# Patient Record
Sex: FEMALE | Race: WHITE | NOT HISPANIC OR LATINO | Employment: FULL TIME | URBAN - METROPOLITAN AREA
[De-identification: names, ages, dates, MRNs, and addresses within clinical notes are randomized per-mention and may not be internally consistent; named-entity substitution may affect disease eponyms.]

---

## 2017-01-01 ENCOUNTER — E-VISIT (OUTPATIENT)
Dept: DERMATOLOGY | Facility: CLINIC | Age: 52
End: 2017-01-01

## 2017-01-01 DIAGNOSIS — R30.0 DYSURIA: Primary | ICD-10-CM

## 2017-01-02 NOTE — TELEPHONE ENCOUNTER
Called pt and she states was seen by [facility] provider in [place] yesterday and was evaluated there and prescribed Nitrofurantoin for UTI. Urine was cultured. Pt is drinking fluids and feels better since starting the antibiotic. Encouraged yogurt and probiotic OTC to help with as well as encouraged fluids. Pt will call  Clinic and re-establish a provider since PCP Dr. Rivera has left if not better. Leonel

## 2017-01-03 NOTE — TELEPHONE ENCOUNTER
Was sent to wrong provider, sent to dermatology. I am not sure why so then I sent to PCP in [place] and our [place].

## 2017-01-16 ENCOUNTER — TRANSFERRED RECORDS (OUTPATIENT)
Dept: HEALTH INFORMATION MANAGEMENT | Facility: CLINIC | Age: 52
End: 2017-01-16

## 2017-05-07 ENCOUNTER — HOSPITAL ENCOUNTER (EMERGENCY)
Facility: CLINIC | Age: 52
Discharge: HOME | End: 2017-05-07
Attending: FAMILY MEDICINE | Admitting: FAMILY MEDICINE
Payer: OTHER MISCELLANEOUS

## 2017-05-07 ENCOUNTER — APPOINTMENT (OUTPATIENT)
Dept: GENERAL RADIOLOGY | Facility: CLINIC | Age: 52
End: 2017-05-07
Attending: FAMILY MEDICINE
Payer: OTHER MISCELLANEOUS

## 2017-05-07 ENCOUNTER — TELEPHONE (OUTPATIENT)
Dept: NURSING | Facility: CLINIC | Age: 52
End: 2017-05-07

## 2017-05-07 VITALS — TEMPERATURE: 98.1 F | DIASTOLIC BLOOD PRESSURE: 96 MMHG | SYSTOLIC BLOOD PRESSURE: 148 MMHG | OXYGEN SATURATION: 100 %

## 2017-05-07 DIAGNOSIS — M75.01 ADHESIVE CAPSULITIS OF RIGHT SHOULDER: ICD-10-CM

## 2017-05-07 DIAGNOSIS — M75.21 BICIPITAL TENDINITIS OF RIGHT SHOULDER: ICD-10-CM

## 2017-05-07 DIAGNOSIS — M75.41 IMPINGEMENT SYNDROME OF RIGHT SHOULDER: ICD-10-CM

## 2017-05-07 PROCEDURE — 99284 EMERGENCY DEPT VISIT MOD MDM: CPT | Mod: 25 | Performed by: FAMILY MEDICINE

## 2017-05-07 PROCEDURE — 20610 DRAIN/INJ JOINT/BURSA W/O US: CPT

## 2017-05-07 PROCEDURE — 99284 EMERGENCY DEPT VISIT MOD MDM: CPT | Mod: 25

## 2017-05-07 PROCEDURE — 73030 X-RAY EXAM OF SHOULDER: CPT | Mod: RT

## 2017-05-07 PROCEDURE — 20610 DRAIN/INJ JOINT/BURSA W/O US: CPT | Performed by: FAMILY MEDICINE

## 2017-05-07 RX ORDER — LIDOCAINE HYDROCHLORIDE 10 MG/ML
10 INJECTION, SOLUTION INFILTRATION; PERINEURAL ONCE
Status: DISCONTINUED | OUTPATIENT
Start: 2017-05-07 | End: 2017-05-07 | Stop reason: HOSPADM

## 2017-05-07 RX ORDER — BUPIVACAINE HYDROCHLORIDE 2.5 MG/ML
10 INJECTION, SOLUTION INFILTRATION; PERINEURAL ONCE
Status: DISCONTINUED | OUTPATIENT
Start: 2017-05-07 | End: 2017-05-07 | Stop reason: HOSPADM

## 2017-05-07 RX ORDER — HYDROCODONE BITARTRATE AND ACETAMINOPHEN 5; 325 MG/1; MG/1
1-2 TABLET ORAL EVERY 6 HOURS PRN
Qty: 6 TABLET | Refills: 0 | Status: SHIPPED | OUTPATIENT
Start: 2017-05-07 | End: 2017-05-07

## 2017-05-07 RX ORDER — TRIAMCINOLONE ACETONIDE 40 MG/ML
40 INJECTION, SUSPENSION INTRA-ARTICULAR; INTRAMUSCULAR ONCE
Status: DISCONTINUED | OUTPATIENT
Start: 2017-05-07 | End: 2017-05-07 | Stop reason: HOSPADM

## 2017-05-07 RX ORDER — HYDROCODONE BITARTRATE AND ACETAMINOPHEN 5; 325 MG/1; MG/1
1-2 TABLET ORAL EVERY 6 HOURS PRN
Qty: 10 TABLET | Refills: 0 | Status: SHIPPED | OUTPATIENT
Start: 2017-05-07 | End: 2017-08-28

## 2017-05-07 ASSESSMENT — ENCOUNTER SYMPTOMS
DYSURIA: 0
HEADACHES: 0
SINUS PRESSURE: 0
FREQUENCY: 0
WHEEZING: 0
PALPITATIONS: 0
DIAPHORESIS: 0
NAUSEA: 0
ABDOMINAL PAIN: 0
BLOOD IN STOOL: 0
VOMITING: 0
FEVER: 0
CONSTIPATION: 0
SORE THROAT: 0
COUGH: 0
DIARRHEA: 0
SHORTNESS OF BREATH: 0

## 2017-05-07 NOTE — ED PROVIDER NOTES
History   No chief complaint on file.    HPI  Devika Laird is a 51 year old female who presents with RT shoulder pain onset years ago, with worsening in the last few months.  Works as  and chronic overuse of the RT arm. No injury, no fall.  No osteoporosis.  No significant neck conditions.   No fever  much worse on friday and then immobility over the last 24 hours limited by painful range of motion.   No overlying rash.    MS - followed by Dale Chowdhury, Neurology    Patient Active Problem List   Diagnosis     Anxiety state     Other nonspecific abnormal result of function study of brain and central nervous system     MS (multiple sclerosis) (H)     Uterine leiomyoma     CARDIOVASCULAR SCREENING; LDL GOAL LESS THAN 160     Health Care Home     Pulmonary nodule     History of tobacco use     Family history of breast cancer in mother     Screening for colorectal cancer     Multiple sclerosis (H)     Current Outpatient Prescriptions   Medication Sig Dispense Refill     cyclobenzaprine (FLEXERIL) 10 MG tablet Take 0.5-1 tablets (5-10 mg) by mouth 3 times daily as needed for muscle spasms 30 tablet 1     Zinc 100 MG TABS Take 1 tablet by mouth       COPAXONE 20 MG/ML injection Inject 40 mg Subcutaneous every other day   10     magnesium 100 MG CAPS Take 1 tablet by mouth Pt unsure of MG       cyanocolbalamin (VITAMIN  B-12) 1000 MCG tablet Take  by mouth daily.       Cholecalciferol (VITAMIN D) 2000 UNIT tablet Take 1 tablet by mouth daily.          Allergies   Allergen Reactions     Penicillin [Esters] Itching     Deep itching.        I have reviewed the Medications, Allergies, Past Medical and Surgical History, and Social History in the Epic system.    Review of Systems   Constitutional: Negative for diaphoresis and fever.   HENT: Negative for ear pain, sinus pressure and sore throat.    Eyes: Negative for visual disturbance.   Respiratory: Negative for cough, shortness of breath and wheezing.     Cardiovascular: Negative for chest pain and palpitations.   Gastrointestinal: Negative for abdominal pain, blood in stool, constipation, diarrhea, nausea and vomiting.   Genitourinary: Negative for dysuria, frequency and urgency.   Skin: Negative for rash.   Neurological: Negative for headaches.   All other systems reviewed and are negative.        Physical Exam   BP: (!) 156/100  Temp: 98.1  F (36.7  C)  Physical Exam    RT SHOULDER:  Forward Flexion: 30 degrees and pain at 30 degrees  Abduction: 30 degrees and pain at  30 degrees  Internal Rotation: T12 compared with T12 on opposite side  External rotation: Unable compared with normal on opposite side  Full cans (Supraspinatus): weak and painful  Apley scratch (Subscapulari): normal  Speeds Test (Biceps): weak and painful  Crossover (AC): negative  Abduction against resistance: negative     Neck demonstrates full range of motion.   The shoulder is tender to palpation but is not assymetrically warm, erythematous and I see no obvious effusion.  Lungs are clear to auscultation without wheezes, rales, stridor      ED Course     ED Course     Arthrocentesis  Date/Time: 5/7/2017 8:27 PM  Performed by: DELMER SILVESTRE  Authorized by: DELMER SILVESTRE   Consent: Verbal consent obtained. Written consent obtained.  Risks and benefits: risks, benefits and alternatives were discussed  Consent given by: patient  Indications: pain   Body area: shoulder  Joint: right shoulder  Local anesthesia used: yes    Anesthesia:  Local anesthesia used: yes  Local Anesthetic: lidocaine 1% without epinephrine and bupivacaine 0.25% without epinephrine   Anesthetic total: 9 mL  Sedation:  Patient sedated: no    Preparation: Patient was prepped and draped in the usual sterile fashion.  Needle gauge: 27 gauge.  Ultrasound guidance: no  Approach: lateral  Triamcinolone amount: 40 mg  Patient tolerance: Patient tolerated the procedure well with no immediate complications                   Critical  Care time:  none               Results for orders placed or performed during the hospital encounter of 05/07/17   Shoulder XR, 2 view, right    Narrative    RIGHT SHOULDER TWO VIEWS   5/7/2017 10:05 AM    HISTORY: Shoulder pain    COMPARISON: 6/24/2013      Impression    IMPRESSION: No fracture or osseous lesion is seen. There has been  development of prominent calcification situated just superior to the  greater tuberosity. This is highly suggestive of calcific tendinitis  of the rotator cuff. No other abnormality or change is seen.     MICHAEL DUMAS MD         Assessments & Plan (with Medical Decision Making)       MDM: Devika Laird is a 51 year old female Who presented With a history of well-controlled MS on Copaxone, with a longer standing history of rotator cuff syndrome, impingement of the right shoulder - supraspinatus and likely bicipital tendonitis.  She notes that this is been painful on and off for some time and likely related to overuse. She however at a more abrupt decreased range of motion in the last 48 hours. Her findings are most suggestive of adhesive capsulitis.  Her x-ray demonstrates no fracture and no obvious other acute etiologies other than likely calcific tendinitis.    I do not suspect septic shoulder and offered a corticosteroid injection today after informed consent she agrees and this is placed with a lateral approach. Kenalog is used as the steroid. She had minimal relief and minimal improvement in range of motion following intra-articular corticosteroid.  I have placed a follow-up appointment with both physical therapy and with orthopedics.  I have ordered oral analgesics and have discussed precautions for return.  We have discussed the importance of maintaining the range of motion that is gentle wall walking and pendulum exercises      I have reviewed the nursing notes.    I have reviewed the findings, diagnosis, plan and need for follow up with the patient.    New  Prescriptions    No medications on file       Final diagnoses:   Impingement syndrome of right shoulder - This was injected with steroid today as well anesthetic.  Continue with wall walking exercises and pendulum exercises,  Follow-up clinic and physical therapy.  The shoulder is frozen right now.  This will also need plain film xray.   Adhesive capsulitis of right shoulder   Bicipital tendinitis of right shoulder       5/7/2017   [facility] EMERGENCY DEPARTMENT     Yan Triana MD  05/07/17 [id number]

## 2017-05-07 NOTE — DISCHARGE INSTRUCTIONS
ICD-10-CM    1. Impingement syndrome of right shoulder M75.41     This was injected with steroid today as well anesthetic.  Continue with wall walking exercises and pendulum exercises,  Follow-up clinic and physical therapy.  The shoulder is frozen right now.  This will also need plain film xray.

## 2017-05-07 NOTE — ED AVS SNAPSHOT
[facility] Emergency Department    [address] [address]    Phone:  [phone]    Fax:  [phone]                                       Devika Laird   MRN: [id number]    Department:  [facility] Emergency Department   Date of Visit:  5/7/2017           After Visit Summary Signature Page     I have received my discharge instructions, and my questions have been answered. I have discussed any challenges I see with this plan with the nurse or doctor.    ..........................................................................................................................................  Patient/Patient Representative Signature      ..........................................................................................................................................  Patient Representative Print Name and Relationship to Patient    ..................................................               ................................................  Date                                            Time    ..........................................................................................................................................  Reviewed by Signature/Title    ...................................................              ..............................................  Date                                                            Time

## 2017-05-07 NOTE — TELEPHONE ENCOUNTER
"Call Type: Triage Call    Presenting Problem: \"I hurt my shoulder.\"  Onset 05/04/17 of pain in  (R) shoulder that has increased and now radiates to elbow. Denies  injury, redness or swelling. Patient states she has a history of  Bursitis in (R) shoulder.  Triage Note:  Guideline Title: Shoulder Non-Injury  Recommended Disposition: See ED Immediately  Original Inclination: Wanted to speak with a nurse  Override Disposition:  Intended Action: Go to Hospital / ED  Physician Contacted: No  New onset of unbearable pain within last 24 hours ?  YES  Gradual onset or worsening weakness/paralysis OR inability to purposely move ? NO  Gradual onset or worsening numbness/tingling ? NO  New onset of severe pain AND change in sensation (numb, tingling, or no feeling),  change in color (pale or blue), feels cool to touch compared to other extremity.  ? NO  New or worsening shoulder pain AND any cardiac signs/symptoms lasting for more  than 5 minutes now or within last hour. Pain is NOT associated with taking a deep  breath or a productive cough, movement, or touch to a localized area on the  chest. ? NO  New unexplained weakness/paralysis, change in sensation (numbness or tingling) or  inability to purposely move, especially when one side of body is involved  occurring now or within last 4 hours ? NO  Physician Instructions:  Care Advice: Another adult should drive.  Do not give the patient anything to eat or drink.  Remove any rings on the fingers of the affected hand, if possible.  IMMEDIATE ACTION  Write down provider's name. List or place the following in a bag for  transport with the patient: current prescription and/or nonprescription  medications  alternative treatments, therapies and medications  and street drugs.  Support part in position of comfort to reduce pain and swelling  avoid unnecessary movement.  "

## 2017-05-07 NOTE — ED AVS SNAPSHOT
[facility] Emergency Department    [address] [address]    Phone:  [phone]    Fax:  [phone]                                       Devika Laird   MRN: [id number]    Department:  [facility] Emergency Department   Date of Visit:  5/7/2017           Patient Information     Date Of Birth          1965        Your diagnoses for this visit were:     Impingement syndrome of right shoulder This was injected with steroid today as well anesthetic.  Continue with wall walking exercises and pendulum exercises,  Follow-up clinic and physical therapy.  The shoulder is frozen right now.  This will also need plain film xray.    Adhesive capsulitis of right shoulder     Bicipital tendinitis of right shoulder        You were seen by Yan Triana MD.      Follow-up Information     Follow up with jeremy jaquze In 1 week.        Follow up with [facility] Emergency Department.    Specialty:  EMERGENCY MEDICINE    Why:  As needed, If symptoms worsen    Contact information:    [address] [place] [id number] [phone]    Additional information:    The medical center is located at   [address] (between [address] in [place], [place] miles north   of [place]).        Discharge Instructions         ICD-10-CM    1. Impingement syndrome of right shoulder M75.41     This was injected with steroid today as well anesthetic.  Continue with wall walking exercises and pendulum exercises,  Follow-up clinic and physical therapy.  The shoulder is frozen right now.  This will also need plain film xray.         24 Hour Appointment Hotline       To make an appointment at any [facility], call [phone] ([phone]). If you don't have a family doctor or clinic, we will help you find one. [facility] are conveniently located to serve the needs of you and your family.          ED Discharge Orders     [facility] PT, HAND, AND CHIROPRACTIC REFERRAL        **This order will print in the [facility] Scheduling Office**    Physical Therapy, Hand Therapy and Chiropractic Care are available through:    *[facility]  *[facility]  *[facility]    Call one number to schedule at any of the above locations: [phone].    Your provider has referred you to: As Indicated:       (M75.41) Impingement syndrome of right shoulder  Comment: This was injected with steroid today as well anesthetic.  Continue with wall walking exercises and pendulum exercises,  Follow-up clinic and physical therapy.  The shoulder is frozen right now.  This will also need plain film xray.  (M75.01) Adhesive capsulitis of right shoulder  (M75.21) Bicipital tendinitis of right shoulder        Indication/Reason for Referral: Shoulder Pain  Onset of Illness: much worse in last 2 days  Therapy Orders: Evaluate and Treat  Special Programs: None  Special Request: None            Please be aware that coverage of these services is subject to the terms and limitations of your health insurance plan.  Call member services at your health plan with any benefit or coverage questions.      Please bring the following to your appointment:    *Your personal calendar for scheduling future appointments  *Comfortable clothing            ORTHO  REFERRAL       [facility] is referring you to the Orthopedic  Services at [facility].       The  Representative will assist you in the coordination of your Orthopedic and Musculoskeletal Care as prescribed by your physician.    The  Representative will call you within 1 business day to help schedule your appointment, or you may contact the  Representative at:    All areas ~ [phone]     Type of Referral : Surgical / Specialist       Timeframe requested: Within 2 weeks    Coverage of these services is subject to the terms and limitations of your  health insurance plan.  Please call member services at your health plan with any benefit or coverage questions.      If X-rays, CT or MRI's have been performed, please contact the facility where they were done to arrange for , prior to your scheduled appointment.  Please bring this referral request to your appointment and present it to your specialist.                     Review of your medicines      START taking        Dose / Directions Last dose taken    HYDROcodone-acetaminophen 5-325 MG per tablet   Commonly known as:  NORCO   Dose:  1-2 tablet   Quantity:  10 tablet        Take 1-2 tablets by mouth every 6 hours as needed for moderate to severe pain   Refills:  0          Our records show that you are taking the medicines listed below. If these are incorrect, please call your family doctor or clinic.        Dose / Directions Last dose taken    aspirin 81 MG tablet   Dose:  81 mg        Take 81 mg by mouth daily   Refills:  0        COPAXONE 20 MG/ML injection   Dose:  40 mg   Generic drug:  glatiramer        Inject 40 mg Subcutaneous every other day   Refills:  10        cyanocobalamin 1000 MCG tablet   Commonly known as:  vitamin  B-12   Dose:  1000 mcg        Take 1,000 mcg by mouth daily   Refills:  0        IBUPROFEN PO   Dose:  800 mg        Take 800 mg by mouth every 8 hours as needed for moderate pain   Refills:  0        magnesium 100 MG Caps   Dose:  1 tablet        Take 1 tablet by mouth daily Pt unsure of MG   Refills:  0        vitamin D 2000 UNITS tablet   Dose:  1 tablet        Take 1 tablet by mouth daily.   Refills:  0        Zinc 100 MG Tabs   Dose:  1 tablet        Take 1 tablet by mouth daily   Refills:  0                Prescriptions were sent or printed at these locations (1 Prescription)                   [facility] [address]   [address] [address]    Telephone:  [phone]   Fax:  [phone]   Hours:                   Printed at Department/Unit printer (1 of 1)         HYDROcodone-acetaminophen (NORCO) 5-325 MG per tablet                Procedures and tests performed during your visit     Shoulder XR, 2 view, right      Orders Needing Specimen Collection     None      Pending Results     No orders found from 5/5/2017 to 5/8/2017.            Pending Culture Results     No orders found from 5/5/2017 to 5/8/2017.            Pending Results Instructions     If you had any lab results that were not finalized at the time of your Discharge, you can call the ED Lab Result RN at [phone]. You will be contacted by this team for any positive Lab results or changes in treatment. The nurses are available 7 days a week from 10A to 6:30P.  You can leave a message 24 hours per day and they will return your call.        Test Results From Your Hospital Stay        5/7/2017 10:26 AM      Narrative     RIGHT SHOULDER TWO VIEWS   5/7/2017 10:05 AM    HISTORY: Shoulder pain    COMPARISON: 6/24/2013        Impression     IMPRESSION: No fracture or osseous lesion is seen. There has been  development of prominent calcification situated just superior to the  greater tuberosity. This is highly suggestive of calcific tendinitis  of the rotator cuff. No other abnormality or change is seen.     MICHAEL DUMAS MD                Thank you for choosing [facility]       Thank you for choosing [facility] for your care. Our goal is always to provide you with excellent care. Hearing back from our patients is one way we can continue to improve our services. Please take a few minutes to complete the written survey that you may receive in the mail after you visit with us. Thank you!        [employer] Information     [employer] gives you secure access to your electronic health record. If you see a primary care provider, you can also send messages to your care team and make appointments. If you have questions, please call your primary care clinic.  If you do not have a  primary care provider, please call [phone] and they will assist you.        Care EveryWhere ID     This is your Care EveryWhere ID. This could be used by other organizations to access your [place] medical records  [id number]        After Visit Summary       This is your record. Keep this with you and show to your community pharmacist(s) and doctor(s) at your next visit.

## 2017-05-16 ENCOUNTER — OFFICE VISIT (OUTPATIENT)
Dept: ORTHOPEDICS | Facility: CLINIC | Age: 52
End: 2017-05-16
Payer: OTHER MISCELLANEOUS

## 2017-05-16 VITALS
SYSTOLIC BLOOD PRESSURE: 134 MMHG | HEART RATE: 90 BPM | DIASTOLIC BLOOD PRESSURE: 86 MMHG | BODY MASS INDEX: 20.66 KG/M2 | HEIGHT: 65 IN | WEIGHT: 124 LBS

## 2017-05-16 DIAGNOSIS — M75.41 SHOULDER IMPINGEMENT SYNDROME, RIGHT: ICD-10-CM

## 2017-05-16 DIAGNOSIS — M25.511 PAIN IN JOINT OF RIGHT SHOULDER: Primary | ICD-10-CM

## 2017-05-16 DIAGNOSIS — M79.18 PAIN OF RIGHT DELTOID: ICD-10-CM

## 2017-05-16 PROCEDURE — 97140 MANUAL THERAPY 1/> REGIONS: CPT | Mod: GP | Performed by: PHYSICIAN ASSISTANT

## 2017-05-16 PROCEDURE — 99203 OFFICE O/P NEW LOW 30 MIN: CPT | Mod: 25 | Performed by: PHYSICIAN ASSISTANT

## 2017-05-16 NOTE — PATIENT INSTRUCTIONS
Plan:  Topical Treatments: Ice, Heat or Topical Analgesics  Over the counter medication: Ibuprofen (Advil) maximum of 800mg one times a day with food  Fish oil 10-15 grams per day  Vitamin D 5000 IU daily (take with fish oil)  Vitamin C 3g three times daily  Observe and monitor symptoms  Activity Modification: as tolerated, listen to body  [phone]  Follow up: as needed

## 2017-05-16 NOTE — NURSING NOTE
"Initial Ht 5' 5\" (1.651 m)  Wt 124 lb (56.2 kg)  BMI 20.63 kg/m2 Estimated body mass index is 20.63 kg/(m^2) as calculated from the following:    Height as of this encounter: 5' 5\" (1.651 m).    Weight as of this encounter: 124 lb (56.2 kg). .      "

## 2017-05-16 NOTE — MR AVS SNAPSHOT
After Visit Summary   5/16/2017    Devika Laird    MRN: [id number]           Patient Information     Date Of Birth          1965        Visit Information        Provider Department      5/16/2017 8:20 AM Brittanie Duarte PA-C [facility]        Today's Diagnoses     Pain in joint of right shoulder    -  1    Pain of right deltoid        Shoulder impingement syndrome, right          Care Instructions    Plan:  Topical Treatments: Ice, Heat or Topical Analgesics  Over the counter medication: Ibuprofen (Advil) maximum of 800mg one times a day with food  Fish oil 10-15 grams per day  Vitamin D 5000 IU daily (take with fish oil)  Vitamin C 3g three times daily  Observe and monitor symptoms  Activity Modification: as tolerated, listen to body  Rehab: Physical Therapy: [facility] - [phone]  Follow up: as needed          Follow-ups after your visit        Additional Services     PHYSICAL THERAPY REFERRAL       *This therapy referral will be filtered to a centralized scheduling office at [facility] and the patient will receive a call to schedule an appointment at a [facility] location most convenient for them. *     [facility] provides Physical Therapy evaluation and treatment and many specialty services across the [facility] system.  If requesting a specialty program, please choose from the list below.    If you have not heard from the scheduling office within 2 business days, please call [phone] for all locations, with the exception of [place], please call [phone].  Treatment: Evaluation & Treatment  Special Instructions/Modalities: neuromuscular re-education, e-stim, tool assisted myofacial release to lats, teres minor, deltoid, biceps, scalenes, shoulder mobilization, cervical mobilization  Special Programs: As indicated by physical therapist      Please be aware that coverage of these services is subject to the  "terms and limitations of your health insurance plan.  Call member services at your health plan with any benefit or coverage questions.      **Note to Provider:  If you are referring outside of [place] for the therapy appointment, please list the name of the location in the  special instructions  above, print the referral and give to the patient to schedule the appointment.                  Who to contact     If you have questions or need follow up information about today's clinic visit or your schedule please contact [facility] directly at [phone].  Normal or non-critical lab and imaging results will be communicated to you by HipLogiqhart, letter or phone within 4 business days after the clinic has received the results. If you do not hear from us within 7 days, please contact the clinic through ApniCuret or phone. If you have a critical or abnormal lab result, we will notify you by phone as soon as possible.  Submit refill requests through Hi-G-Tek or call your pharmacy and they will forward the refill request to us. Please allow 3 business days for your refill to be completed.          Additional Information About Your Visit        Hi-G-Tek Information     Hi-G-Tek gives you secure access to your electronic health record. If you see a primary care provider, you can also send messages to your care team and make appointments. If you have questions, please call your primary care clinic.  If you do not have a primary care provider, please call [phone] and they will assist you.        Care EveryWhere ID     This is your Care EveryWhere ID. This could be used by other organizations to access your [place] medical records  [id number]        Your Vitals Were     Height BMI (Body Mass Index)                5' 5\" (1.651 m) 20.63 kg/m2           Blood Pressure from Last 3 Encounters:   05/07/17 (!) 148/96   03/02/16 120/70   05/07/15 (!) 138/98    Weight from Last 3 Encounters:   05/16/17 124 lb (56.2 kg) "   03/02/16 124 lb (56.2 kg)   05/07/15 131 lb 1.6 oz (59.5 kg)              We Performed the Following     PHYSICAL THERAPY REFERRAL        Primary Care Provider Office Phone # Fax #    Lora Lona Rivera [phone]       [facility] [address]        Thank you!     Thank you for choosing [facility]  for your care. Our goal is always to provide you with excellent care. Hearing back from our patients is one way we can continue to improve our services. Please take a few minutes to complete the written survey that you may receive in the mail after your visit with us. Thank you!             Your Updated Medication List - Protect others around you: Learn how to safely use, store and throw away your medicines at www.disposemymeds.org.          This list is accurate as of: 5/16/17  8:50 AM.  Always use your most recent med list.                   Brand Name Dispense Instructions for use    aspirin 81 MG tablet      Take 81 mg by mouth daily       COPAXONE 20 MG/ML injection   Generic drug:  glatiramer      Inject 40 mg Subcutaneous every other day       cyanocobalamin 1000 MCG tablet    vitamin  B-12     Take 1,000 mcg by mouth daily       HYDROcodone-acetaminophen 5-325 MG per tablet    NORCO    10 tablet    Take 1-2 tablets by mouth every 6 hours as needed for moderate to severe pain       IBUPROFEN PO      Take 800 mg by mouth every 8 hours as needed for moderate pain       magnesium 100 MG Caps      Take 1 tablet by mouth daily Pt unsure of MG       vitamin D 2000 UNITS tablet      Take 1 tablet by mouth daily.       Zinc 100 MG Tabs      Take 1 tablet by mouth daily

## 2017-05-16 NOTE — LETTER
[facility]  [address] [facility] [address]  Phone: [phone]  Fax: [phone]    May 16, 2017        Devika Laird  [address] [facility] [address]          To whom it may concern:    RE: Devika Laird    Patient was seen and treated today at our clinic.  Devika is to remain off work until Monday May 22, 2017.  At that time, she may then return to work without restrictions.    Please contact me for questions or concerns.      Sincerely,        Brittanie Duarte PA-C

## 2017-05-16 NOTE — PROGRESS NOTES
Sports Medicine Clinic Visit    PCP: Lora Rivera    Devika JOSE Laird is a 51 year old female who is seen  as an ER referral presenting with right shoulder pain.     Injury: noticed pain increase.  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    Location of Pain: Anterior right shoulder/ bicep  Duration of Pain: 1 month  Rating of Pain at worst: 8/10  Rating of Pain Currently: 3/10  Symptoms are better with: IBU, Heat pad, rest  Symptoms are worse with: Lifting arm above chest height, repetative motions.  Additional Features:   Positive: None  Other evaluation and/or treatments so far consists of: injection therapy  Prior History of related problems: MS    Social History:     Review of Systems  Musculoskeletal: as above  Remainder of review of systems is negative including constitutional, CV, pulmonary, GI, Skin and Neurologic except as noted in HPI or medical history.    Family history, medical history and surgical history have all been discussed with patient and appended to medical chart below.    Past Medical History:   Diagnosis Date     Mediastinal adenopathy      Multiple scleroses      Sprain of neck     Cervical strain     Past Surgical History:   Procedure Laterality Date     D & C       ENDOBRONCHIAL ULTRASOUND FLEXIBLE  9/16/2013    Procedure: ENDOBRONCHIAL ULTRASOUND FLEXIBLE;  WITH FNA;  Surgeon: Nathan Bucio MD;  Location:  GI     SURGICAL HISTORY OF -       cyst on jawline     Family History   Problem Relation Age of Onset     Breast Cancer Mother      53 year when diagnosed     Gynecology Mother      Thyroid Disease Mother      HEART DISEASE Mother      valve issue/takes no med     Lipids Mother      Breast Cancer Maternal Aunt      DIABETES Maternal Grandmother      HEART DISEASE Maternal Grandmother      CEREBROVASCULAR DISEASE Maternal Grandmother      Arthritis Maternal Grandmother      Hypertension Father       "Musculoskeletal Disorder Father      GASTROINTESTINAL DISEASE Paternal Grandmother      Arthritis Paternal Grandmother      GASTROINTESTINAL DISEASE Paternal Grandfather      CANCER Paternal Grandfather      skin cancer     Hypertension Maternal Grandfather      Allergies Brother      Objective  Ht 5' 5\" (1.651 m)  Wt 124 lb (56.2 kg)  BMI 20.63 kg/m2  Constitutional:well-developed, well-nourished, and in no distress.   Cardiovascular: Intact distal pulses.    Neurological: alert. Gait normal  Skin: Skin is warm and dry.   Psychiatric: Mood and affect normal.   Respiratory: unlabored, speaks in full sentences  Hematologic/Lymphatic/Immunologic: neg lymphadenopathy, neg lymphedema    Exam:  Right Shoulder exam    ROM:        forward flexion 90        abduction 70       internal rotation lumbar spine       external rotation 60    Tender:        Deltoid, biceps, teres minor, scalenes, supraspinatus, latissimus dorsi    Non Tender:       remainder of shoulder    Strength:        abduction 4/5       internal rotation 4/5       external rotation 4/5       adduction 4/5    Impingement testing:        positive (+) Neer       positive (+) Simons       positive (+) empty can    Stability testing:       neg (-) posterior compression    Skin:        no visible deformities       well perfused       capillary refill brisk    Sensation:        normal sensation over shoulder and upper extremity         Radiology:  Study Result   RIGHT SHOULDER TWO VIEWS 5/7/2017 10:05 AM     HISTORY: Shoulder pain     COMPARISON: 6/24/2013         IMPRESSION: No fracture or osseous lesion is seen. There has been  development of prominent calcification situated just superior to the  greater tuberosity. This is highly suggestive of calcific tendinitis  of the rotator cuff. No other abnormality or change is seen.      MICHAEL DUMAS MD       I have personally reviewed images with patient    Assessment:  1. Pain in joint of right shoulder    2. " Pain of right deltoid    3. Shoulder impingement syndrome, right    4.  Possible calcific tendinitis    Plan:  Discussed the assessment with the patient.  15 minutes of myofacial release to deltoid, scalenes, teres minor, latissimus dorsi, biceps, supraspinatus muscle.  Patient had full ROM following treatment along with full strength  Topical Treatments: Ice, Heat or Topical Analgesics  Over the counter medication: Ibuprofen (Advil) maximum of 800mg one times a day with food  Fish oil 10-15 grams per day  Vitamin D 5000 IU daily (take with fish oil)  Vitamin C 3g three times daily  Observe and monitor symptoms  Activity Modification: as tolerated, listen to body  Rehab: Physical Therapy: [facility] - [phone]  Follow up: as needed  If symptoms return and are localized to subacromial region, patient may benefit from PRP or repeat steroid injection          Brittanie Duarte PA-C  [facility]  [facility]      Disclaimer: This note consists of symbols derived from keyboarding, dictation and/or voice recognition software. As a result, there may be errors in the script that have gone undetected. Please consider this when interpreting information found in this chart.

## 2017-05-19 ENCOUNTER — TELEPHONE (OUTPATIENT)
Dept: ORTHOPEDICS | Facility: CLINIC | Age: 52
End: 2017-05-19

## 2017-05-19 NOTE — LETTER
[facility]  [address]  [address]  [facility] [address]  Phone: [phone]  Fax: [phone]    May 22, 2017        Devika Laird  [address] [facility] [address]          To whom it may concern:    RE: Devika Laird    Patient was seen and treated at our clinic.  She may return to work on May 22, 2017.      Please contact me for questions or concerns.      Sincerely,        Brittanie Duarte PA-C

## 2017-05-19 NOTE — TELEPHONE ENCOUNTER
Reason for Call:  Patient is calling in states that her injury was workers comp however wasn;t reported or billed as such and all information will need to be changed including workability /return to work note    Patient is requesting a return to work note for Monday May 22    Detailed comments: see above    Phone Number Patient can be reached at: Home number on file [phone] (home)    Best Time: any    Can we leave a detailed message on this number? YES    Call taken on 5/19/2017 at 9:51 AM by Kalani Leija

## 2017-05-22 NOTE — TELEPHONE ENCOUNTER
The work injury is documented in the note.  I will write her a letter for work restrictions.  As to how this is billed to work comp, I am not sure how to move forward with that.    Brittanie Duarte PA-C

## 2017-05-22 NOTE — TELEPHONE ENCOUNTER
Recommend patient return to clinic to discuss this with provider unless provider is comfortable doing over the phone or has recollection of the work comp component.   Amberly Ayoub, ATC

## 2017-05-22 NOTE — TELEPHONE ENCOUNTER
Left message requesting info on whether she is trying to return/unable to work. Requested she informs of of where/ how that letter is to be received.    Noted that appt notes states that pain was attributed working with computer.    I gave business office number to discuss with them how to change the charges for the appt from her ins to work comp. Instructed her to have all of her work comp information with her when she makes this call.    Batsheva CRAIN,  CMA

## 2017-05-23 NOTE — TELEPHONE ENCOUNTER
Pt calling back statin the letter from the 22 of may worked after talking w/ work comp. She no longer needs another letter at this     [facility]  Specialty CSS

## 2017-05-24 ENCOUNTER — HOSPITAL ENCOUNTER (OUTPATIENT)
Dept: PHYSICAL THERAPY | Facility: CLINIC | Age: 52
Setting detail: THERAPIES SERIES
End: 2017-05-24
Attending: PHYSICIAN ASSISTANT
Payer: OTHER MISCELLANEOUS

## 2017-05-24 PROCEDURE — 97140 MANUAL THERAPY 1/> REGIONS: CPT | Mod: GP | Performed by: PHYSICAL THERAPIST

## 2017-05-24 PROCEDURE — 97161 PT EVAL LOW COMPLEX 20 MIN: CPT | Mod: GP | Performed by: PHYSICAL THERAPIST

## 2017-05-24 PROCEDURE — 40000718 ZZHC STATISTIC PT DEPARTMENT ORTHO VISIT: Performed by: PHYSICAL THERAPIST

## 2017-05-24 PROCEDURE — 97110 THERAPEUTIC EXERCISES: CPT | Mod: GP | Performed by: PHYSICAL THERAPIST

## 2017-05-25 NOTE — PROGRESS NOTES
05/24/17 1300   General Information   Type of Visit Initial OP Ortho PT Evaluation   Start of Care Date 05/24/17   Referring Physician Brittanie Duarte PA-C    Patient/Family Goals Statement to reduce pain    Orders Evaluate and Treat   Orders Comment Special Instructions/Modalities: neuromuscular re-education, e-stim, tool assisted myofacial release to lats, teres minor, deltoid, biceps, scalenes, shoulder mobilization, cervical mobilization   Date of Order 05/16/17   Insurance Type Other   Insurance Comments/Visits Authorized WC - auth sent   Medical Diagnosis Pain in joint of right shoulder M25.511  - Primary Pain of right deltoid M79.1 Shoulder impingement syndrome, right M75.41   Surgical/Medical history reviewed Yes   Precautions/Limitations no known precautions/limitations   Body Part(s)   Body Part(s) Shoulder   Presentation and Etiology   Pertinent history of current problem (include personal factors and/or comorbidities that impact the POC) Pt reports due to constant mousing at work she has been having dull shoulder pain. She tried to change positions at work however due to repetitive activity her shoulder/has been bothering her. Pt reports her pain became excruitiating on 5/4/17. Pt had an injection in the ER but she was not sure if it was helpful. Pt denies neck pain or radicular symptoms. Pt reports shoulder completely locked up on her until she saw Brittanie Duarte. Pt will have a work site assessment on 6/7/17. She is R hand dominant. PMH: MS (pt relates only has dizziness and no flare-ups), broken wrist - 1973, gland removed under R ear 2006    Impairments B. Decreased WB tolerance;F. Decreased strength and endurance   Functional Limitations perform activities of daily living;perform required work activities   Symptom Location R shoulder   How/Where did it occur From insidious onset   Onset date of current episode/exacerbation 05/04/17   Chronicity Recurrent   Pain rating (0-10 point scale)  Best (/10);Worst (/10)   Best (/10) 3   Worst (/10) 10   Pain quality A. Sharp;B. Dull;C. Aching;D. Burning;H. Other   Pain quality comment depends on how i move it   Frequency of pain/symptoms C. With activity   Pain/symptoms exacerbated by L. Work tasks;M. Other   Pain exacerbation comment certain movements   Pain/symptoms eased by C. Rest;G. Heat;I. OTC medication(s)   Progression of symptoms since onset: Improved   Current / Previous Interventions   Diagnostic Tests: X-ray   X-ray Results Results   X-ray results IMPRESSION: No fracture or osseous lesion is seen. There has been development of prominent calcification situated just superior to the greater tuberosity. This is highly suggestive of calcific tendinitis of the rotator cuff. No other abnormality or change is seen.   Current Level of Function   Current Community Support Family/friend caregiver   Patient role/employment history A. Employed   Employment Comments     Living environment [facility]   Functional Scales   Functional Scales Other   Other Scales  SPADI: 43%   Shoulder Objective Findings   Side (if bilateral, select both right and left) Right   Observation scapular winging bi    Posture fair   Cervical Screen (ROM, quadrant) WNL   Shoulder ROM Comment 70   Shoulder Flexibility Comments elbow flex: 4/5    Simons-Jerry Test neg   Coracoid Test neg   Sulcus Test neg   Crossover Test neg   Palpation TTP biceps tendon   Right Shoulder Flexion PROM 180   Right Shoulder Abduction AROM 140 w pain and guarding   Right Shoulder Abduction PROM 90   Right Shoulder ER AROM 90   Right Shoulder ER PROM C3   Right Shoulder IR AROM 90   Right Shoulder IR PROM T8    Right Shoulder Flexion Strength 4+/5   Right Shoulder Abduction Strength 3/5   Right Shoulder ER Strength 3/5   Right Shoulder IR Strength 4/5   Planned Therapy Interventions   Planned Therapy Interventions joint mobilization;manual therapy;neuromuscular  re-education;ROM;strengthening;stretching   Planned Modality Interventions   Planned Modality Interventions Electrical stimulation;Cryotherapy;Hot packs;TENS;Traction   Clinical Impression   Criteria for Skilled Therapeutic Interventions Met yes, treatment indicated   PT Diagnosis Decreased ROM and pain in shoulder associated with pos imingement and signs of adhessive capsulitits   Influenced by the following impairments decreased ROM, pain   Functional limitations due to impairments working, lifting, overhead motions   Clinical Presentation Stable/Uncomplicated   Clinical Decision Making (Complexity) Low complexity   Therapy Frequency 1 time/week   Predicted Duration of Therapy Intervention (days/wks) 10 weeks   Risk & Benefits of therapy have been explained Yes   Patient, Family & other staff in agreement with plan of care Yes   Education Assessment   Preferred Learning Style Listening;Pictures/video;Reading;Demonstration   Barriers to Learning No barriers   ORTHO GOALS   PT Ortho Eval Goals 1;2;3;4   Ortho Goal 1   Goal Identifier ROM   Goal Description Pt will demonstrate full  GH AROM with less than 1/10 pain in order to be able to don/doff jacket/shirt to return to PLOF w/o  pain.   Target Date 06/29/17   Ortho Goal 2   Goal Identifier posture   Goal Description Pt will demonstrate good sitting posture throughout session to demonstrate increased postural awareness and increased postural strength during work .   Target Date 06/29/17   Ortho Goal 3   Goal Identifier work tasks   Goal Description Pt will demonstrate 5/5 GH abd/add/ ER/IR in order to be able to return to PLOF and complete work tasks    Target Date 08/03/17   Ortho Goal 4   Goal Identifier SPADI   Goal Description Pt will report <10% disability on SPADI to demonstrate improved ability to complete daily activities and demonstrate significant clinical improvement   Target Date 08/03/17   Total Evaluation Time   Total Evaluation Time 45 ( 20 eval , 10  TE, 15 MT)        Maribel Lizama  Physical Therapist  [address] [address]  [email or web address]   [email or web address]   Office: [phone] Fax: [phone]

## 2017-06-01 ENCOUNTER — TELEPHONE (OUTPATIENT)
Dept: ORTHOPEDICS | Facility: CLINIC | Age: 52
End: 2017-06-01

## 2017-06-01 ENCOUNTER — HOSPITAL ENCOUNTER (OUTPATIENT)
Dept: PHYSICAL THERAPY | Facility: CLINIC | Age: 52
Setting detail: THERAPIES SERIES
End: 2017-06-01
Attending: PHYSICIAN ASSISTANT
Payer: OTHER MISCELLANEOUS

## 2017-06-01 PROCEDURE — 40000718 ZZHC STATISTIC PT DEPARTMENT ORTHO VISIT: Performed by: PHYSICAL THERAPIST

## 2017-06-01 PROCEDURE — 97110 THERAPEUTIC EXERCISES: CPT | Mod: GP | Performed by: PHYSICAL THERAPIST

## 2017-06-01 PROCEDURE — 97140 MANUAL THERAPY 1/> REGIONS: CPT | Mod: GP | Performed by: PHYSICAL THERAPIST

## 2017-06-01 NOTE — TELEPHONE ENCOUNTER
Our goal is to complete and return forms within 5-7 business days of receiving the request.    Type of form Requested: WC - Health Care Provider Report  Form requested by (include company):   [employer]    Phone number for requestor:   Date form is requested by: ASAP    How will form be returned?:  fax to [facility]  Has the patient signed a consent form for release of information (may be included with form)? Yes  Additional comments: Will have Dr Bowman sign form, as Brittanie is no longer employed by [employer].    Form was started and place in Provider Basket for provider review/ completion at [facility].    Vargas Canela ATC

## 2017-06-08 ENCOUNTER — HOSPITAL ENCOUNTER (OUTPATIENT)
Dept: PHYSICAL THERAPY | Facility: CLINIC | Age: 52
Setting detail: THERAPIES SERIES
End: 2017-06-08
Attending: PHYSICIAN ASSISTANT
Payer: OTHER MISCELLANEOUS

## 2017-06-08 PROCEDURE — 40000718 ZZHC STATISTIC PT DEPARTMENT ORTHO VISIT: Performed by: PHYSICAL THERAPIST

## 2017-06-08 PROCEDURE — 97140 MANUAL THERAPY 1/> REGIONS: CPT | Mod: GP | Performed by: PHYSICAL THERAPIST

## 2017-06-08 PROCEDURE — 97110 THERAPEUTIC EXERCISES: CPT | Mod: GP | Performed by: PHYSICAL THERAPIST

## 2017-06-15 ENCOUNTER — HOSPITAL ENCOUNTER (OUTPATIENT)
Dept: PHYSICAL THERAPY | Facility: CLINIC | Age: 52
Setting detail: THERAPIES SERIES
End: 2017-06-15
Attending: PHYSICIAN ASSISTANT
Payer: OTHER MISCELLANEOUS

## 2017-06-15 PROCEDURE — 97140 MANUAL THERAPY 1/> REGIONS: CPT | Mod: GP | Performed by: PHYSICAL THERAPIST

## 2017-06-15 PROCEDURE — 40000718 ZZHC STATISTIC PT DEPARTMENT ORTHO VISIT: Performed by: PHYSICAL THERAPIST

## 2017-06-15 PROCEDURE — 97110 THERAPEUTIC EXERCISES: CPT | Mod: GP | Performed by: PHYSICAL THERAPIST

## 2017-06-22 ENCOUNTER — HOSPITAL ENCOUNTER (OUTPATIENT)
Dept: PHYSICAL THERAPY | Facility: CLINIC | Age: 52
Setting detail: THERAPIES SERIES
End: 2017-06-22
Attending: PHYSICIAN ASSISTANT
Payer: OTHER MISCELLANEOUS

## 2017-06-22 PROCEDURE — 40000718 ZZHC STATISTIC PT DEPARTMENT ORTHO VISIT: Performed by: PHYSICAL THERAPIST

## 2017-06-22 PROCEDURE — 97110 THERAPEUTIC EXERCISES: CPT | Mod: GP | Performed by: PHYSICAL THERAPIST

## 2017-07-01 NOTE — TELEPHONE ENCOUNTER
Pt seen in PT on 6/22/17.  Form signed. Not at [facility], PPD too early to determine.  Yan Vargas, , CAQ

## 2017-07-10 ENCOUNTER — HOSPITAL ENCOUNTER (OUTPATIENT)
Dept: PHYSICAL THERAPY | Facility: CLINIC | Age: 52
Setting detail: THERAPIES SERIES
End: 2017-07-10
Attending: PHYSICIAN ASSISTANT
Payer: OTHER MISCELLANEOUS

## 2017-07-10 PROCEDURE — 97110 THERAPEUTIC EXERCISES: CPT | Mod: GP | Performed by: PHYSICAL THERAPIST

## 2017-07-10 PROCEDURE — 40000718 ZZHC STATISTIC PT DEPARTMENT ORTHO VISIT: Performed by: PHYSICAL THERAPIST

## 2017-07-10 NOTE — PROGRESS NOTES
Outpatient Physical Therapy Discharge Note     Patient: Devika Laird  : 1965    Beginning/End Dates of Reporting Period:  17 to 7/10/2017    Referring Provider: Brittanie Duarte PA-C    Therapy Diagnosis: Decreased ROM and pain in shoulder associated with pos imingement and signs of adhessive capsulitits     Client Self Report: Pt relates overall she is doing very well. She has min to no pain at home w palpation. Pt does express concern over return to work in the fall though.     Objective Measurements:  Objective Measure: flex  Details: 180     Objective Measure: abd  Details: 180    Objective Measure: ER  Details: T2    Objective Measure: IR  Details: T4     Objective Measure: MMT  Details: ER: 4+/5 IR: 5/5            Outcome Measures (most recent score):  SPADI: 0.77% impaired (eval: 43.33% impaired)         Goals:  Goal Identifier ROM   Goal Description Pt will demonstrate full  GH AROM with less than 1/10 pain in order to be able to don/doff jacket/shirt to return to PLOF w/o  pain.   Target Date 17   Date Met  07/10/17   Progress:goal met     Goal Identifier posture   Goal Description Pt will demonstrate good sitting posture throughout session to demonstrate increased postural awareness and increased postural strength during work .   Target Date 17   Date Met      Progress:pt is more aware of posture however requires min cues to demonstrate good posture 100% of the time     Goal Identifier work tasks   Goal Description Pt will demonstrate 5/5 GH abd/add/ ER/IR in order to be able to return to PLOF and complete work tasks    Target Date 17   Date Met      Progress:strength has improved however pt is still min weak in ER      Goal Identifier SPADI   Goal Description Pt will report <10% disability on SPADI to demonstrate improved ability to complete daily activities and demonstrate significant clinical improvement   Target Date 17   Date Met  07/10/17   Progress:goal met        Progress Toward Goals:   Progress this reporting period: Pt has been seen for 6 visits over this POC. In that time, pt has regained full AROM w/o pain. Strength has improved, however she does have weakness with GH ER and is min TTP over RTC insertion but assume this will continue to improve with continued HEP. Pt SPADI has also shown significant improvements. Due to these findings, pt is appropriate to d/c to HEP. Pt is encouraged to continue HEP/posture activities upon retuning to work in the fall.         Plan:  Discharge from therapy.    Discharge:    Reason for Discharge: Patient has met all goals.  No further expectation of progress, can progress self with HEP at home    Equipment Issued: NA    Discharge Plan: Patient to continue home program.    Maribel Lizama  Physical Therapist  [facility]  [address] [place] [address]  [email or web address]   [email or web address]   Office: [phone] Fax: [phone]

## 2017-07-24 ENCOUNTER — OFFICE VISIT (OUTPATIENT)
Dept: FAMILY MEDICINE | Facility: CLINIC | Age: 52
End: 2017-07-24
Payer: COMMERCIAL

## 2017-07-24 VITALS
TEMPERATURE: 96.6 F | SYSTOLIC BLOOD PRESSURE: 145 MMHG | WEIGHT: 125 LBS | DIASTOLIC BLOOD PRESSURE: 80 MMHG | BODY MASS INDEX: 20.83 KG/M2 | HEIGHT: 65 IN | HEART RATE: 76 BPM | RESPIRATION RATE: 16 BRPM

## 2017-07-24 DIAGNOSIS — G35 MULTIPLE SCLEROSIS (H): ICD-10-CM

## 2017-07-24 DIAGNOSIS — Z12.11 SCREEN FOR COLON CANCER: ICD-10-CM

## 2017-07-24 DIAGNOSIS — N30.01 ACUTE CYSTITIS WITH HEMATURIA: ICD-10-CM

## 2017-07-24 DIAGNOSIS — R30.0 DYSURIA: Primary | ICD-10-CM

## 2017-07-24 DIAGNOSIS — R03.0 ELEVATED BLOOD PRESSURE READING WITHOUT DIAGNOSIS OF HYPERTENSION: ICD-10-CM

## 2017-07-24 DIAGNOSIS — R82.90 NONSPECIFIC FINDING ON EXAMINATION OF URINE: ICD-10-CM

## 2017-07-24 LAB
ALBUMIN UR-MCNC: NEGATIVE MG/DL
APPEARANCE UR: CLEAR
BACTERIA #/AREA URNS HPF: ABNORMAL /HPF
BILIRUB UR QL STRIP: NEGATIVE
COLOR UR AUTO: ABNORMAL
GLUCOSE UR STRIP-MCNC: NEGATIVE MG/DL
HGB UR QL STRIP: ABNORMAL
KETONES UR STRIP-MCNC: NEGATIVE MG/DL
LEUKOCYTE ESTERASE UR QL STRIP: ABNORMAL
NITRATE UR QL: NEGATIVE
NON-SQ EPI CELLS #/AREA URNS LPF: ABNORMAL /LPF
PH UR STRIP: 6 PH (ref 5–7)
RBC #/AREA URNS AUTO: ABNORMAL /HPF (ref 0–2)
SP GR UR STRIP: <=1.005 (ref 1–1.03)
URN SPEC COLLECT METH UR: ABNORMAL
UROBILINOGEN UR STRIP-ACNC: 0.2 EU/DL (ref 0.2–1)
WBC #/AREA URNS AUTO: ABNORMAL /HPF (ref 0–2)

## 2017-07-24 PROCEDURE — 87186 SC STD MICRODIL/AGAR DIL: CPT | Performed by: FAMILY MEDICINE

## 2017-07-24 PROCEDURE — 99214 OFFICE O/P EST MOD 30 MIN: CPT | Performed by: FAMILY MEDICINE

## 2017-07-24 PROCEDURE — 87088 URINE BACTERIA CULTURE: CPT | Performed by: FAMILY MEDICINE

## 2017-07-24 PROCEDURE — 81001 URINALYSIS AUTO W/SCOPE: CPT | Performed by: FAMILY MEDICINE

## 2017-07-24 PROCEDURE — 87086 URINE CULTURE/COLONY COUNT: CPT | Performed by: FAMILY MEDICINE

## 2017-07-24 RX ORDER — SULFAMETHOXAZOLE/TRIMETHOPRIM 800-160 MG
1 TABLET ORAL 2 TIMES DAILY
Qty: 10 TABLET | Refills: 0 | Status: SHIPPED | OUTPATIENT
Start: 2017-07-24 | End: 2017-07-29

## 2017-07-24 NOTE — PATIENT INSTRUCTIONS
"   * BLADDER INFECTION,Female (Adult)    A bladder infection (\"cystitis\" or \"UTI\") usually causes a constant urge to urinate and a burning when passing urine. Urine may be cloudy, smelly or dark. There may be pain in the lower abdomen. A bladder infection occurs when bacteria from the vaginal area enter the bladder opening (urethra). This can occur from sexual intercourse, wearing tight clothing, dehydration and other factors.  HOME CARE:  1. Drink lots of fluids (at least 6-8 glasses a day, unless you must restrict fluids for other medical reasons). This will force the medicine into your urinary system and flush the bacteria out of your body. Cranberry juice has been shown to help clear out the bacteria.  2. Avoid sexual intercourse until your symptoms are gone.  3. A bladder infection is treated with antibiotics. You may also be given Pyridium (generic = phenazopyridine) to reduce the burning sensation. This medicine will cause your urine to become a bright orange color. The orange urine may stain clothing. You may wear a pad or panty-liner to protect clothing.  PREVENTING FUTURE INFECTIONS:  1. Always wipe from front to back after a bowel movement.  2. Keep the genital area clean and dry.  3. Drink plenty of fluids each day to avoid dehydration.  4. Urinate right after intercourse to flush out the bladder.  5. Wear cotton underwear and cotton-lined panty hose; avoid tight-fitting pants.  6. If you are on birth control pills and are having frequent bladder infections, discuss with your doctor.  FOLLOW UP: Return to this facility or see your doctor if ALL symptoms are not gone after three days of treatment.  GET PROMPT MEDICAL ATTENTION if any of the following occur:    Fever over 101 F (38.3 C)    No improvement by the third day of treatment    Increasing back or abdominal pain    Repeated vomiting; unable to keep medicine down    Weakness, dizziness or fainting    Vaginal discharge    Pain, redness or swelling in " the labia (outer vaginal area)    2495-6023 [employer], [address] [place] [address], [place] [address]. All rights reserved. This information is not intended as a substitute for professional medical care. Always follow your healthcare professional's instructions.    Patient Education    Sulfamethoxazole, Trimethoprim Oral suspension    Sulfamethoxazole, Trimethoprim Oral tablet    Sulfamethoxazole, Trimethoprim Solution for injection  Sulfamethoxazole, Trimethoprim Oral tablet  What is this medicine?  SULFAMETHOXAZOLE; TRIMETHOPRIM or SMX-TMP (suhl fuh meth OK charmaine zohl; trye METH oh prim) is a combination of a sulfonamide antibiotic and a second antibiotic, trimethoprim. It is used to treat or prevent certain kinds of bacterial infections. It will not work for colds, flu, or other viral infections.  This medicine may be used for other purposes; ask your health care provider or pharmacist if you have questions.  What should I tell my health care provider before I take this medicine?  They need to know if you have any of these conditions:    anemia    asthma    being treated with anticonvulsants    if you frequently drink alcohol containing drinks    kidney disease    liver disease    low level of folic acid or glucose-6-phosphate dehydrogenase    poor nutrition or malabsorption    porphyria    severe allergies    thyroid disorder    an unusual or allergic reaction to sulfamethoxazole, trimethoprim, sulfa drugs, other medicines, foods, dyes, or preservatives    pregnant or trying to get pregnant    breast-feeding  How should I use this medicine?  Take this medicine by mouth with a full glass of water. Follow the directions on the prescription label. Take your medicine at regular intervals. Do not take it more often than directed. Do not skip doses or stop your medicine early.  Talk to your pediatrician regarding the use of this medicine in children. Special care may be needed. This medicine has been used in  children as young as 2 months of age.  Overdosage: If you think you have taken too much of this medicine contact a poison control center or emergency room at once.  NOTE: This medicine is only for you. Do not share this medicine with others.  What if I miss a dose?  If you miss a dose, take it as soon as you can. If it is almost time for your next dose, take only that dose. Do not take double or extra doses.  What may interact with this medicine?  Do not take this medicine with any of the following medications:    aminobenzoate potassium    dofetilide    metronidazole  This medicine may also interact with the following medications:    ACE inhibitors like benazepril, enalapril, lisinopril, and ramipril    birth control pills    cyclosporine    digoxin    diuretics    indomethacin    medicines for diabetes    methenamine    methotrexate    phenytoin    potassium supplements    pyrimethamine    sulfinpyrazone    tricyclic antidepressants    warfarin  This list may not describe all possible interactions. Give your health care provider a list of all the medicines, herbs, non-prescription drugs, or dietary supplements you use. Also tell them if you smoke, drink alcohol, or use illegal drugs. Some items may interact with your medicine.  What should I watch for while using this medicine?  Tell your doctor or health care professional if your symptoms do not improve. Drink several glasses of water a day to reduce the risk of kidney problems.  Do not treat diarrhea with over the counter products. Contact your doctor if you have diarrhea that lasts more than 2 days or if it is severe and watery.  This medicine can make you more sensitive to the sun. Keep out of the sun. If you cannot avoid being in the sun, wear protective clothing and use a sunscreen. Do not use sun lamps or tanning beds/booths.  What side effects may I notice from receiving this medicine?  Side effects that you should report to your doctor or health care  professional as soon as possible:    allergic reactions like skin rash or hives, swelling of the face, lips, or tongue    breathing problems    fever or chills, sore throat    irregular heartbeat, chest pain    joint or muscle pain    pain or difficulty passing urine    red pinpoint spots on skin    redness, blistering, peeling or loosening of the skin, including inside the mouth    unusual bleeding or bruising    unusually weak or tired    yellowing of the eyes or skin  Side effects that usually do not require medical attention (report to your doctor or health care professional if they continue or are bothersome):    diarrhea    dizziness    headache    loss of appetite    nausea, vomiting    nervousness  This list may not describe all possible side effects. Call your doctor for medical advice about side effects. You may report side effects to FDA at 9-485-FDA-4278.  Where should I keep my medicine?  Keep out of the reach of children.  Store at room temperature between 20 to 25 degrees C (68 to 77 degrees F). Protect from light. Throw away any unused medicine after the expiration date.  NOTE:This sheet is a summary. It may not cover all possible information. If you have questions about this medicine, talk to your doctor, pharmacist, or health care provider. Copyright  2016 [employer]

## 2017-07-24 NOTE — NURSING NOTE
"Chief Complaint   Patient presents with     Urinary Problem       Initial BP (!) 157/100 (Cuff Size: Adult Regular)  Pulse 76  Temp 96.6  F (35.9  C) (Tympanic)  Resp 16  Ht 5' 5\" (1.651 m)  Wt 125 lb (56.7 kg)  Breastfeeding? No  BMI 20.8 kg/m2 Estimated body mass index is 20.8 kg/(m^2) as calculated from the following:    Height as of this encounter: 5' 5\" (1.651 m).    Weight as of this encounter: 125 lb (56.7 kg).  Medication Reconciliation: complete    Health Maintenance that is potentially due pending provider review:  Mammogram and Colonoscopy/FIT    Pt declines to have currently- might do them at the [facility].    Is there anyone who you would like to be able to receive your results? Not Applicable  If yes have patient fill out TARA    "

## 2017-07-24 NOTE — PROGRESS NOTES
SUBJECTIVE:                                                    Devika Laird is a 51 year old female who presents to clinic today for the following health issues:      URINARY TRACT SYMPTOMS      Duration: today     Description  dysuria    Intensity:  moderate    Accompanying signs and symptoms:  Fever/chills: no   Flank pain no   Nausea and vomiting: no   Vaginal symptoms: none  Abdominal/Pelvic Pain: YES- some light cramping     History  History of frequent UTI's: YES- 3rd one in the last 2 years   History of kidney stones: no   Sexually Active: YES  Possibility of pregnancy: No    Precipitating or alleviating factors: None    Therapies tried and outcome: increase fluid intake   Outcome:         Problem list and histories reviewed & adjusted, as indicated.  Additional history: as documented    Patient Active Problem List   Diagnosis     Anxiety state     Other nonspecific abnormal result of function study of brain and central nervous system     MS (multiple sclerosis) (H)     Uterine leiomyoma     CARDIOVASCULAR SCREENING; LDL GOAL LESS THAN 160     Health Care Home     Pulmonary nodule     History of tobacco use     Family history of breast cancer in mother     Screening for colorectal cancer     Multiple sclerosis (H)     Past Surgical History:   Procedure Laterality Date     D & C       ENDOBRONCHIAL ULTRASOUND FLEXIBLE  9/16/2013    Procedure: ENDOBRONCHIAL ULTRASOUND FLEXIBLE;  WITH FNA;  Surgeon: Nathan Bucio MD;  Location:  GI     SURGICAL HISTORY OF -       cyst on jawline       Social History   Substance Use Topics     Smoking status: Former Smoker     Packs/day: 0.10     Years: 20.00     Types: Cigarettes     Quit date: 8/10/2013     Smokeless tobacco: Never Used      Comment: 2-3 cigs per day off and on 3/2013     Alcohol use No     Family History   Problem Relation Age of Onset     Breast Cancer Mother      53 year when diagnosed     Gynecology Mother      Thyroid Disease Mother       HEART DISEASE Mother      valve issue/takes no med     Lipids Mother      DIABETES Maternal Grandmother      HEART DISEASE Maternal Grandmother      CEREBROVASCULAR DISEASE Maternal Grandmother      Arthritis Maternal Grandmother      Hypertension Father      Musculoskeletal Disorder Father      GASTROINTESTINAL DISEASE Paternal Grandmother      Arthritis Paternal Grandmother      GASTROINTESTINAL DISEASE Paternal Grandfather      CANCER Paternal Grandfather      skin cancer     Hypertension Maternal Grandfather      Allergies Brother      Breast Cancer Maternal Aunt          Current Outpatient Prescriptions   Medication Sig Dispense Refill     aspirin 81 MG tablet Take 81 mg by mouth daily       IBUPROFEN PO Take 800 mg by mouth every 8 hours as needed for moderate pain       HYDROcodone-acetaminophen (NORCO) 5-325 MG per tablet Take 1-2 tablets by mouth every 6 hours as needed for moderate to severe pain 10 tablet 0     Zinc 100 MG TABS Take 1 tablet by mouth daily        COPAXONE 20 MG/ML injection Inject 40 mg Subcutaneous every other day   10     magnesium 100 MG CAPS Take 1 tablet by mouth daily Pt unsure of MG       cyanocolbalamin (VITAMIN  B-12) 1000 MCG tablet Take 1,000 mcg by mouth daily        Cholecalciferol (VITAMIN D) 2000 UNIT tablet Take 1 tablet by mouth daily.       Allergies   Allergen Reactions     Penicillin [Esters] Itching     Deep itching.      Recent Labs   Lab Test  05/18/16   1545  05/07/15   1126  09/10/13   1507   ALT   --   35   --    CR  0.65  0.67  0.67   GFRESTIMATED  >90  Non  GFR Calc    >90  Non  GFR Calc    >90   GFRESTBLACK  >90   GFR Calc    >90   GFR Calc    >90   POTASSIUM   --   3.5  4.0      BP Readings from Last 3 Encounters:   07/24/17 (!) 157/100   05/16/17 134/86   05/07/17 (!) 148/96    Wt Readings from Last 3 Encounters:   07/24/17 125 lb (56.7 kg)   05/16/17 124 lb (56.2 kg)   03/02/16 124 lb (56.2 kg)  "                 Labs reviewed in EPIC          Reviewed and updated as needed this visit by clinical staffAllergies       Reviewed and updated as needed this visit by Provider         ROS:  Constitutional, HEENT, cardiovascular, pulmonary, gi and gu systems are negative, except as otherwise noted.      OBJECTIVE:   BP (!) 157/100 (Cuff Size: Adult Regular)  Pulse 76  Temp 96.6  F (35.9  C) (Tympanic)  Resp 16  Ht 5' 5\" (1.651 m)  Wt 125 lb (56.7 kg)  Breastfeeding? No  BMI 20.8 kg/m2  Body mass index is 20.8 kg/(m^2).  GENERAL: healthy, alert and no distress  EYES: Eyes grossly normal to inspection, PERRL and conjunctivae and sclerae normal  NECK: no adenopathy, no asymmetry, masses, or scars and thyroid normal to palpation  RESP: lungs clear to auscultation - no rales, rhonchi or wheezes  CV: regular rate and rhythm, normal S1 S2, no S3 or S4, no murmur, click or rub, no peripheral edema and peripheral pulses strong  ABDOMEN: soft, nontender, no hepatosplenomegaly, no masses and bowel sounds normal  MS: no gross musculoskeletal defects noted, no edema  NEURO: Normal strength and tone, mentation intact and speech normal    Diagnostic Test Results:  Results for orders placed or performed in visit on 07/24/17 (from the past 24 hour(s))   *UA reflex to Microscopic and Culture ([place] [facility], [place] (except [place] and [place])   Result Value Ref Range    Color Urine Pink     Appearance Urine Clear     Glucose Urine Negative NEG mg/dL    Bilirubin Urine Negative NEG    Ketones Urine Negative NEG mg/dL    Specific Gravity Urine <=1.005 1.003 - 1.035    Blood Urine Large (A) NEG    pH Urine 6.0 5.0 - 7.0 pH    Protein Albumin Urine Negative NEG mg/dL    Urobilinogen Urine 0.2 0.2 - 1.0 EU/dL    Nitrite Urine Negative NEG    Leukocyte Esterase Urine Moderate (A) NEG    Source Midstream Urine  Midstream Urine      Urine Microscopic   Result Value Ref Range    WBC Urine 10-25 (A) 0 - 2 /HPF    RBC Urine " "10-25 (A) 0 - 2 /HPF    Squamous Epithelial /LPF Urine Few FEW /LPF    Bacteria Urine Few (A) NEG /HPF       ASSESSMENT/PLAN:         1. Dysuria  - *UA reflex to Microscopic and Culture ([facility] (except [place] and [place])  - Urine Microscopic      2. Acute cystitis with hematuria  - Bactrim prescribed, common side effects discussed and suggested to drink plenty of fluids  - Urine culture sent, will inform her of the results once available  - sulfamethoxazole-trimethoprim (BACTRIM DS/SEPTRA DS) 800-160 MG per tablet; Take 1 tablet by mouth 2 times daily for 5 days  Dispense: 10 tablet; Refill: 0  - Urine Culture Aerobic Bacterial      3. Multiple sclerosis (H)  - Symptoms well controlled  - Taking copaxone injection every other day  - Following neurology (Dr Villagran)      4. Elevated blood pressure reading without diagnosis of hypertension  - Blood pressure noted to be elevated, not taking any medications  - Patient regard blood pressure elevation due to white coat hypertension  - Suggested to monitor blood pressure at home and follow-up with RN in 2 weeks      5. Screen for colon cancer  - would like to do FIT test for colon cancer screening      Patient would like to do mammography at [facility], not interested for tetanus vaccination, lipid or hepatitis C screening. Written information provided as below. All questions answered.    MEDICATIONS:   Orders Placed This Encounter   Medications     sulfamethoxazole-trimethoprim (BACTRIM DS/SEPTRA DS) 800-160 MG per tablet     Sig: Take 1 tablet by mouth 2 times daily for 5 days     Dispense:  10 tablet     Refill:  0       Patient Instructions      * BLADDER INFECTION,Female (Adult)    A bladder infection (\"cystitis\" or \"UTI\") usually causes a constant urge to urinate and a burning when passing urine. Urine may be cloudy, smelly or dark. There may be pain in the lower abdomen. A bladder infection occurs when bacteria from the vaginal area enter the " bladder opening (urethra). This can occur from sexual intercourse, wearing tight clothing, dehydration and other factors.  HOME CARE:  1. Drink lots of fluids (at least 6-8 glasses a day, unless you must restrict fluids for other medical reasons). This will force the medicine into your urinary system and flush the bacteria out of your body. Cranberry juice has been shown to help clear out the bacteria.  2. Avoid sexual intercourse until your symptoms are gone.  3. A bladder infection is treated with antibiotics. You may also be given Pyridium (generic = phenazopyridine) to reduce the burning sensation. This medicine will cause your urine to become a bright orange color. The orange urine may stain clothing. You may wear a pad or panty-liner to protect clothing.  PREVENTING FUTURE INFECTIONS:  1. Always wipe from front to back after a bowel movement.  2. Keep the genital area clean and dry.  3. Drink plenty of fluids each day to avoid dehydration.  4. Urinate right after intercourse to flush out the bladder.  5. Wear cotton underwear and cotton-lined panty hose; avoid tight-fitting pants.  6. If you are on birth control pills and are having frequent bladder infections, discuss with your doctor.  FOLLOW UP: Return to this facility or see your doctor if ALL symptoms are not gone after three days of treatment.  GET PROMPT MEDICAL ATTENTION if any of the following occur:    Fever over 101 F (38.3 C)    No improvement by the third day of treatment    Increasing back or abdominal pain    Repeated vomiting; unable to keep medicine down    Weakness, dizziness or fainting    Vaginal discharge    Pain, redness or swelling in the labia (outer vaginal area)    6028-1430 [employer], [address] [place] [address], [place] [address]. All rights reserved. This information is not intended as a substitute for professional medical care. Always follow your healthcare professional's instructions.    Patient  Education    Sulfamethoxazole, Trimethoprim Oral suspension    Sulfamethoxazole, Trimethoprim Oral tablet    Sulfamethoxazole, Trimethoprim Solution for injection  Sulfamethoxazole, Trimethoprim Oral tablet  What is this medicine?  SULFAMETHOXAZOLE; TRIMETHOPRIM or SMX-TMP (suhl fuh meth OK charmaine zohl; trye METH oh prim) is a combination of a sulfonamide antibiotic and a second antibiotic, trimethoprim. It is used to treat or prevent certain kinds of bacterial infections. It will not work for colds, flu, or other viral infections.  This medicine may be used for other purposes; ask your health care provider or pharmacist if you have questions.  What should I tell my health care provider before I take this medicine?  They need to know if you have any of these conditions:    anemia    asthma    being treated with anticonvulsants    if you frequently drink alcohol containing drinks    kidney disease    liver disease    low level of folic acid or glucose-6-phosphate dehydrogenase    poor nutrition or malabsorption    porphyria    severe allergies    thyroid disorder    an unusual or allergic reaction to sulfamethoxazole, trimethoprim, sulfa drugs, other medicines, foods, dyes, or preservatives    pregnant or trying to get pregnant    breast-feeding  How should I use this medicine?  Take this medicine by mouth with a full glass of water. Follow the directions on the prescription label. Take your medicine at regular intervals. Do not take it more often than directed. Do not skip doses or stop your medicine early.  Talk to your pediatrician regarding the use of this medicine in children. Special care may be needed. This medicine has been used in children as young as 2 months of age.  Overdosage: If you think you have taken too much of this medicine contact a poison control center or emergency room at once.  NOTE: This medicine is only for you. Do not share this medicine with others.  What if I miss a dose?  If you miss a  dose, take it as soon as you can. If it is almost time for your next dose, take only that dose. Do not take double or extra doses.  What may interact with this medicine?  Do not take this medicine with any of the following medications:    aminobenzoate potassium    dofetilide    metronidazole  This medicine may also interact with the following medications:    ACE inhibitors like benazepril, enalapril, lisinopril, and ramipril    birth control pills    cyclosporine    digoxin    diuretics    indomethacin    medicines for diabetes    methenamine    methotrexate    phenytoin    potassium supplements    pyrimethamine    sulfinpyrazone    tricyclic antidepressants    warfarin  This list may not describe all possible interactions. Give your health care provider a list of all the medicines, herbs, non-prescription drugs, or dietary supplements you use. Also tell them if you smoke, drink alcohol, or use illegal drugs. Some items may interact with your medicine.  What should I watch for while using this medicine?  Tell your doctor or health care professional if your symptoms do not improve. Drink several glasses of water a day to reduce the risk of kidney problems.  Do not treat diarrhea with over the counter products. Contact your doctor if you have diarrhea that lasts more than 2 days or if it is severe and watery.  This medicine can make you more sensitive to the sun. Keep out of the sun. If you cannot avoid being in the sun, wear protective clothing and use a sunscreen. Do not use sun lamps or tanning beds/booths.  What side effects may I notice from receiving this medicine?  Side effects that you should report to your doctor or health care professional as soon as possible:    allergic reactions like skin rash or hives, swelling of the face, lips, or tongue    breathing problems    fever or chills, sore throat    irregular heartbeat, chest pain    joint or muscle pain    pain or difficulty passing urine    red pinpoint  spots on skin    redness, blistering, peeling or loosening of the skin, including inside the mouth    unusual bleeding or bruising    unusually weak or tired    yellowing of the eyes or skin  Side effects that usually do not require medical attention (report to your doctor or health care professional if they continue or are bothersome):    diarrhea    dizziness    headache    loss of appetite    nausea, vomiting    nervousness  This list may not describe all possible side effects. Call your doctor for medical advice about side effects. You may report side effects to FDA at 6-268-OZD-4445.  Where should I keep my medicine?  Keep out of the reach of children.  Store at room temperature between 20 to 25 degrees C (68 to 77 degrees F). Protect from light. Throw away any unused medicine after the expiration date.  NOTE:This sheet is a summary. It may not cover all possible information. If you have questions about this medicine, talk to your doctor, pharmacist, or health care provider. Copyright  2016 [employer]            Manav Braun MD  [facility]

## 2017-07-24 NOTE — MR AVS SNAPSHOT
"              After Visit Summary   7/24/2017    Devika Laird    MRN: [id number]           Patient Information     Date Of Birth          1965        Visit Information        Provider Department      7/24/2017 8:40 AM Manav Braun MD [facility]        Today's Diagnoses     Dysuria    -  1    Acute cystitis with hematuria        Multiple sclerosis (H)        Elevated blood pressure reading without diagnosis of hypertension        Screen for colon cancer        Nonspecific finding on examination of urine          Care Instructions       * BLADDER INFECTION,Female (Adult)    A bladder infection (\"cystitis\" or \"UTI\") usually causes a constant urge to urinate and a burning when passing urine. Urine may be cloudy, smelly or dark. There may be pain in the lower abdomen. A bladder infection occurs when bacteria from the vaginal area enter the bladder opening (urethra). This can occur from sexual intercourse, wearing tight clothing, dehydration and other factors.  HOME CARE:  1. Drink lots of fluids (at least 6-8 glasses a day, unless you must restrict fluids for other medical reasons). This will force the medicine into your urinary system and flush the bacteria out of your body. Cranberry juice has been shown to help clear out the bacteria.  2. Avoid sexual intercourse until your symptoms are gone.  3. A bladder infection is treated with antibiotics. You may also be given Pyridium (generic = phenazopyridine) to reduce the burning sensation. This medicine will cause your urine to become a bright orange color. The orange urine may stain clothing. You may wear a pad or panty-liner to protect clothing.  PREVENTING FUTURE INFECTIONS:  1. Always wipe from front to back after a bowel movement.  2. Keep the genital area clean and dry.  3. Drink plenty of fluids each day to avoid dehydration.  4. Urinate right after intercourse to flush out the bladder.  5. Wear cotton underwear and cotton-lined panty " hose; avoid tight-fitting pants.  6. If you are on birth control pills and are having frequent bladder infections, discuss with your doctor.  FOLLOW UP: Return to this facility or see your doctor if ALL symptoms are not gone after three days of treatment.  GET PROMPT MEDICAL ATTENTION if any of the following occur:    Fever over 101 F (38.3 C)    No improvement by the third day of treatment    Increasing back or abdominal pain    Repeated vomiting; unable to keep medicine down    Weakness, dizziness or fainting    Vaginal discharge    Pain, redness or swelling in the labia (outer vaginal area)    4523-8246 [employer], [address] [place], [place] [address]. All rights reserved. This information is not intended as a substitute for professional medical care. Always follow your healthcare professional's instructions.    Patient Education    Sulfamethoxazole, Trimethoprim Oral suspension    Sulfamethoxazole, Trimethoprim Oral tablet    Sulfamethoxazole, Trimethoprim Solution for injection  Sulfamethoxazole, Trimethoprim Oral tablet  What is this medicine?  SULFAMETHOXAZOLE; TRIMETHOPRIM or SMX-TMP (suhl fuh meth OK charmaine zohl; trye METH oh prim) is a combination of a sulfonamide antibiotic and a second antibiotic, trimethoprim. It is used to treat or prevent certain kinds of bacterial infections. It will not work for colds, flu, or other viral infections.  This medicine may be used for other purposes; ask your health care provider or pharmacist if you have questions.  What should I tell my health care provider before I take this medicine?  They need to know if you have any of these conditions:    anemia    asthma    being treated with anticonvulsants    if you frequently drink alcohol containing drinks    kidney disease    liver disease    low level of folic acid or glucose-6-phosphate dehydrogenase    poor nutrition or malabsorption    porphyria    severe allergies    thyroid disorder    an unusual or  allergic reaction to sulfamethoxazole, trimethoprim, sulfa drugs, other medicines, foods, dyes, or preservatives    pregnant or trying to get pregnant    breast-feeding  How should I use this medicine?  Take this medicine by mouth with a full glass of water. Follow the directions on the prescription label. Take your medicine at regular intervals. Do not take it more often than directed. Do not skip doses or stop your medicine early.  Talk to your pediatrician regarding the use of this medicine in children. Special care may be needed. This medicine has been used in children as young as 2 months of age.  Overdosage: If you think you have taken too much of this medicine contact a poison control center or emergency room at once.  NOTE: This medicine is only for you. Do not share this medicine with others.  What if I miss a dose?  If you miss a dose, take it as soon as you can. If it is almost time for your next dose, take only that dose. Do not take double or extra doses.  What may interact with this medicine?  Do not take this medicine with any of the following medications:    aminobenzoate potassium    dofetilide    metronidazole  This medicine may also interact with the following medications:    ACE inhibitors like benazepril, enalapril, lisinopril, and ramipril    birth control pills    cyclosporine    digoxin    diuretics    indomethacin    medicines for diabetes    methenamine    methotrexate    phenytoin    potassium supplements    pyrimethamine    sulfinpyrazone    tricyclic antidepressants    warfarin  This list may not describe all possible interactions. Give your health care provider a list of all the medicines, herbs, non-prescription drugs, or dietary supplements you use. Also tell them if you smoke, drink alcohol, or use illegal drugs. Some items may interact with your medicine.  What should I watch for while using this medicine?  Tell your doctor or health care professional if your symptoms do not  improve. Drink several glasses of water a day to reduce the risk of kidney problems.  Do not treat diarrhea with over the counter products. Contact your doctor if you have diarrhea that lasts more than 2 days or if it is severe and watery.  This medicine can make you more sensitive to the sun. Keep out of the sun. If you cannot avoid being in the sun, wear protective clothing and use a sunscreen. Do not use sun lamps or tanning beds/booths.  What side effects may I notice from receiving this medicine?  Side effects that you should report to your doctor or health care professional as soon as possible:    allergic reactions like skin rash or hives, swelling of the face, lips, or tongue    breathing problems    fever or chills, sore throat    irregular heartbeat, chest pain    joint or muscle pain    pain or difficulty passing urine    red pinpoint spots on skin    redness, blistering, peeling or loosening of the skin, including inside the mouth    unusual bleeding or bruising    unusually weak or tired    yellowing of the eyes or skin  Side effects that usually do not require medical attention (report to your doctor or health care professional if they continue or are bothersome):    diarrhea    dizziness    headache    loss of appetite    nausea, vomiting    nervousness  This list may not describe all possible side effects. Call your doctor for medical advice about side effects. You may report side effects to FDA at 2-760-FDA-2435.  Where should I keep my medicine?  Keep out of the reach of children.  Store at room temperature between 20 to 25 degrees C (68 to 77 degrees F). Protect from light. Throw away any unused medicine after the expiration date.  NOTE:This sheet is a summary. It may not cover all possible information. If you have questions about this medicine, talk to your doctor, pharmacist, or health care provider. Copyright  2016 Gold Standard                Follow-ups after your visit        Your next 10  "appointments already scheduled     Jul 24, 2017 10:00 AM CDT   Ortho Treatment with Maribel Lizama PT   [facility] ([facility])    [address] [place] [address] [phone]            Aug 07, 2017 11:45 AM CDT   Return Visit with Mica Kingsley PA-C   [facility] ([facility]    [address] [facility] [address] [phone]              Who to contact     If you have questions or need follow up information about today's clinic visit or your schedule please contact [facility] directly at [phone].  Normal or non-critical lab and imaging results will be communicated to you by [employer], letter or phone within 4 business days after the clinic has received the results. If you do not hear from us within 7 days, please contact the clinic through [employer] or phone. If you have a critical or abnormal lab result, we will notify you by phone as soon as possible.  Submit refill requests through [employer] or call your pharmacy and they will forward the refill request to us. Please allow 3 business days for your refill to be completed.          Additional Information About Your Visit        [employer] Information     [employer] gives you secure access to your electronic health record. If you see a primary care provider, you can also send messages to your care team and make appointments. If you have questions, please call your primary care clinic.  If you do not have a primary care provider, please call [phone] and they will assist you.        Care EveryWhere ID     This is your Care EveryWhere ID. This could be used by other organizations to access your [facility] medical records  [id number]        Your Vitals Were     Pulse Temperature Respirations Height Breastfeeding? BMI (Body Mass Index)    76 96.6  F (35.9  C) (Tympanic) 16 5' 5\" (1.651 m) No 20.8 kg/m2       Blood Pressure from Last 3 Encounters: "   07/24/17 145/80   05/16/17 134/86   05/07/17 (!) 148/96    Weight from Last 3 Encounters:   07/24/17 125 lb (56.7 kg)   05/16/17 124 lb (56.2 kg)   03/02/16 124 lb (56.2 kg)              We Performed the Following     *UA reflex to Microscopic and Culture ([place] and [facility] (except [place] and [place])     Urine Culture Aerobic Bacterial     Urine Microscopic          Today's Medication Changes          These changes are accurate as of: 7/24/17  8:57 AM.  If you have any questions, ask your nurse or doctor.               Start taking these medicines.        Dose/Directions    sulfamethoxazole-trimethoprim 800-160 MG per tablet   Commonly known as:  BACTRIM DS/SEPTRA DS   Used for:  Acute cystitis with hematuria   Started by:  Manav Braun MD        Dose:  1 tablet   Take 1 tablet by mouth 2 times daily for 5 days   Quantity:  10 tablet   Refills:  0            Where to get your medicines      These medications were sent to [facility] [address] [address]     Phone:  [phone]     sulfamethoxazole-trimethoprim 800-160 MG per tablet                Primary Care Provider Office Phone # Fax #    Lora Lona Rivera [phone]       [facility] [address] [facility] [address]        Equal Access to Services     [facility] AH: Hadii juwan corralo Sovalarie, waaxda luqadaha, qaybta kaalmada lana abdalla. So [facility] [phone].    ATENCIÓN: Si habla español, tiene a doty disposición servicios gratuitos de asistencia lingüística. Miguelangel al [phone].    We comply with applicable federal civil rights laws and [place] laws. We do not discriminate on the basis of race, color, national origin, age, disability sex, sexual orientation or gender identity.            Thank you!     Thank you for choosing [facility]  for your care. Our goal is always to  provide you with excellent care. Hearing back from our patients is one way we can continue to improve our services. Please take a few minutes to complete the written survey that you may receive in the mail after your visit with us. Thank you!             Your Updated Medication List - Protect others around you: Learn how to safely use, store and throw away your medicines at www.disposemymeds.org.          This list is accurate as of: 7/24/17  8:57 AM.  Always use your most recent med list.                   Brand Name Dispense Instructions for use Diagnosis    aspirin 81 MG tablet      Take 81 mg by mouth daily        COPAXONE 20 MG/ML injection   Generic drug:  glatiramer      Inject 40 mg Subcutaneous every other day        cyanocobalamin 1000 MCG tablet    vitamin  B-12     Take 1,000 mcg by mouth daily        HYDROcodone-acetaminophen 5-325 MG per tablet    NORCO    10 tablet    Take 1-2 tablets by mouth every 6 hours as needed for moderate to severe pain        IBUPROFEN PO      Take 800 mg by mouth every 8 hours as needed for moderate pain        magnesium 100 MG Caps      Take 1 tablet by mouth daily Pt unsure of MG        sulfamethoxazole-trimethoprim 800-160 MG per tablet    BACTRIM DS/SEPTRA DS    10 tablet    Take 1 tablet by mouth 2 times daily for 5 days    Acute cystitis with hematuria       vitamin D 2000 UNITS tablet      Take 1 tablet by mouth daily.        Zinc 100 MG Tabs      Take 1 tablet by mouth daily

## 2017-07-27 LAB
BACTERIA SPEC CULT: ABNORMAL
MICRO REPORT STATUS: ABNORMAL
MICROORGANISM SPEC CULT: ABNORMAL
SPECIMEN SOURCE: ABNORMAL

## 2017-08-04 ENCOUNTER — TELEPHONE (OUTPATIENT)
Dept: FAMILY MEDICINE | Facility: CLINIC | Age: 52
End: 2017-08-04

## 2017-08-04 NOTE — TELEPHONE ENCOUNTER
S-(situation): dysuria    B-(background): started today, seen in office visit on 7-24-17, prescribed Bacrtim    A-(assessment):   Patient called and is reporting re-occurring symptoms of dysuria, states it stings when using the bathroom and just finished antibiotic. States a little cramping, no back or flank pain, no fever, and no discharge. States is drinking fluids well.    R-(recommendations): Advised to be seen again, appointment scheduled for today. Patient agrees with plan. Advised tylenol or ibuprofen. TRENT Manzo

## 2017-08-04 NOTE — TELEPHONE ENCOUNTER
Called was treated for UTI finished antibiotic  last Friday and feels she still having syms.    Julisa Dinh CSS

## 2017-08-07 ENCOUNTER — OFFICE VISIT (OUTPATIENT)
Dept: DERMATOLOGY | Facility: CLINIC | Age: 52
End: 2017-08-07
Payer: COMMERCIAL

## 2017-08-07 ENCOUNTER — TELEPHONE (OUTPATIENT)
Dept: DERMATOLOGY | Facility: CLINIC | Age: 52
End: 2017-08-07

## 2017-08-07 VITALS — SYSTOLIC BLOOD PRESSURE: 154 MMHG | DIASTOLIC BLOOD PRESSURE: 97 MMHG | OXYGEN SATURATION: 99 % | HEART RATE: 110 BPM

## 2017-08-07 DIAGNOSIS — L81.4 LENTIGO: ICD-10-CM

## 2017-08-07 DIAGNOSIS — D48.5 NEOPLASM OF UNCERTAIN BEHAVIOR OF SKIN: Primary | ICD-10-CM

## 2017-08-07 DIAGNOSIS — C44.519 BASAL CELL CARCINOMA OF BACK: Primary | ICD-10-CM

## 2017-08-07 DIAGNOSIS — L82.1 SEBORRHEIC KERATOSIS: ICD-10-CM

## 2017-08-07 PROCEDURE — 99203 OFFICE O/P NEW LOW 30 MIN: CPT | Mod: 25 | Performed by: PHYSICIAN ASSISTANT

## 2017-08-07 PROCEDURE — 88331 PATH CONSLTJ SURG 1 BLK 1SPC: CPT | Performed by: DERMATOLOGY

## 2017-08-07 PROCEDURE — 11100 HC BIOPSY SKIN/SUBQ/MUC MEM, SINGLE LESION: CPT | Performed by: PHYSICIAN ASSISTANT

## 2017-08-07 NOTE — PROGRESS NOTES
HPI:   Devika Laird is a 51 year old female who presents for evaluation of a spot on the back  chief complaint  Location: left lower back - bump that has recently become tender and oozes    Condition present for:  Few months.   Previous treatments include: none  -no h/o skin CA    Review Of Systems  Eyes: negative  Ears/Nose/Throat: negative  Respiratory: No shortness of breath, dyspnea on exertion, cough, or hemoptysis  Cardiovascular: negative  Gastrointestinal: negative  Genitourinary: negative  Musculoskeletal: negative  Neurologic: negative  Psychiatric: negative        PHYSICAL EXAM:      Skin exam performed as follows: Type 2 skin. Mood appropriate  Alert and Oriented X 3. Well developed, well nourished in no distress.  General appearance: Normal  Head including face: Normal  Eyes: conjunctiva and lids: Normal  Mouth: Lips, teeth, gums: Normal  Neck: Normal  Chest-breast/axillae: Normal  Back: Normal  Spleen and liver: Normal  Cardiovascular: Exam of peripheral vascular system by observation for swelling, varicosities, edema: Normal  Genitalia: groin, buttocks: Normal  Extremities: digits/nails (clubbing): Normal  Eccrine and Apocrine glands: Normal  Right upper extremity: Normal  Left upper extremity: Normal  Right lower extremity: Normal  Left lower extremity: Normal  Skin: Scalp and body hair: See below    1. 4 mm pink papule on left lower back    ASSESSMENT/PLAN:     1. R/o BCC vs DN on left lower back. Shave bx in typical fashion .  Area cleaned with betadyne and anesthetized with 1% lidocaine with epi .  Dermablade used to remove the lesion and sent to pathology. Bleeding was cauterized. Pt tolerated procedure well.  2. Seborrheic keratoses on back - all benign in appearance no treatment needed  3. Numerous lentigos on face and back - benign.   4. Diffuse photodamage of skin - advised. Discussed sun protection at length.           Follow-up: pending path/yearly FSE/PRN sooner  CC:   Scribed By: Mica  Sancho MS, PANAYELY

## 2017-08-07 NOTE — LETTER
Devika JOSE Sisi  [address] [facility] [address]    August 7, 2017      Dear     You are scheduled for Mohs Surgery on Monday August 28 th at 8:15 am.    Please check in at Dermatology Clinic.   (2nd Floor, last  Clinic on right up staircase or elevator -past OB/GYN clinic)    You don't need to arrive more than 5-10 minutes prior to your appointment time.     Be sure to eat a good breakfast and bathe and wash your hair prior to Surgery.    If you are taking any anti-coagulants that are prescribed by your Doctor (such as Coumadin/warfarin, Plavix, Aspirin, Ibuprofen), please continue taking them.     However, If you are taking anti-coagulants over the counter without  a Doctor's order for a Medical condition, please discontinue them 10 days prior to Surgery.      Please wear loose comfortable clothing as it could possibly be 4-6 hours until your surgery is completed depending upon how many layers of tissue need to be removed.     Wi-fi access is available.     Montana Parker MD/ Maria L Busby RN

## 2017-08-07 NOTE — TELEPHONE ENCOUNTER
Patient notified. Patient verbalized understanding. Scheduled for MOHS Surgery. Mohs brochure/Pre-op letter sent.   Maria L Busby RN

## 2017-08-07 NOTE — NURSING NOTE
"Initial BP (!) 154/97  Pulse 110  SpO2 99% Estimated body mass index is 20.8 kg/(m^2) as calculated from the following:    Height as of 7/24/17: 1.651 m (5' 5\").    Weight as of 7/24/17: 56.7 kg (125 lb). .      "

## 2017-08-07 NOTE — MR AVS SNAPSHOT
After Visit Summary   8/7/2017    Devika Laird    MRN: [id number]           Patient Information     Date Of Birth          1965        Visit Information        Provider Department      8/7/2017 11:45 AM Mica Kingsley PA-C [facility]        Today's Diagnoses     Neoplasm of uncertain behavior of skin    -  1    Lentigo        Seborrheic keratosis          Care Instructions          Wound Care Instructions     FOR SUPERFICIAL WOUNDS     [facility] [phone]    [facility] [phone]                       AFTER 24 HOURS YOU SHOULD REMOVE THE BANDAGE AND BEGIN DAILY DRESSING CHANGES AS FOLLOWS:     1) Remove Dressing.     2) Clean and dry the area with tap water using a Q-tip or sterile gauze pad.     3) Apply Vaseline, Aquaphor, Polysporin ointment or Bacitracin ointment over entire wound.  Do NOT use Neosporin ointment.     4) Cover the wound with a band-aid, or a sterile non-stick gauze pad and micropore paper tape      REPEAT THESE INSTRUCTIONS AT LEAST ONCE A DAY UNTIL THE WOUND HAS COMPLETELY HEALED.    It is an old wives tale that a wound heals better when it is exposed to air and allowed to dry out. The wound will heal faster with a better cosmetic result if it is kept moist with ointment and covered with a bandage.    **Do not let the wound dry out.**      Supplies Needed:      *Cotton tipped applicators (Q-tips)    *Polysporin Ointment or Bacitracin Ointment (NOT NEOSPORIN)    *Band-aids or non-stick gauze pads and micropore paper tape.      PATIENT INFORMATION:    During the healing process you will notice a number of changes. All wounds develop a small halo of redness surrounding the wound.  This means healing is occurring. Severe itching with extensive redness usually indicates sensitivity to the ointment or bandage tape used to dress the wound.  You should call our office if this develops.      Swelling  and/or discoloration around  your surgical site is common, particularly when performed around the eye.    All wounds normally drain.  The larger the wound the more drainage there will be.  After 7-10 days, you will notice the wound beginning to shrink and new skin will begin to grow.  The wound is healed when you can see skin has formed over the entire area.  A healed wound has a healthy, shiny look to the surface and is red to dark pink in color to normalize.  Wounds may take approximately 4-6 weeks to heal.  Larger wounds may take 6-8 weeks.  After the wound is healed you may discontinue dressing changes.    You may experience a sensation of tightness as your wound heals. This is normal and will gradually subside.    Your healed wound may be sensitive to temperature changes. This sensitivity improves with time, but if you re having a lot of discomfort, try to avoid temperature extremes.    Patients frequently experience itching after their wound appears to have healed because of the continue healing under the skin.  Plain Vaseline will help relieve the itching.        POSSIBLE COMPLICATIONS    BLEEDIN. Leave the bandage in place.  2. Use tightly rolled up gauze or a cloth to apply direct pressure over the bandage for 30  minutes.  3. Reapply pressure for an additional 30 minutes if necessary  4. Use additional gauze and tape to maintain pressure once the bleeding has stopped.            Follow-ups after your visit        Who to contact     If you have questions or need follow up information about today's clinic visit or your schedule please contact [facility] directly at [phone].  Normal or non-critical lab and imaging results will be communicated to you by MyChart, letter or phone within 4 business days after the clinic has received the results. If you do not hear from us within 7 days, please contact the clinic through MyChart or phone. If you have a critical or abnormal lab result, we will notify you by phone as  soon as possible.  Submit refill requests through [employer] or call your pharmacy and they will forward the refill request to us. Please allow 3 business days for your refill to be completed.          Additional Information About Your Visit        [email or web address] Information     [employer] gives you secure access to your electronic health record. If you see a primary care provider, you can also send messages to your care team and make appointments. If you have questions, please call your primary care clinic.  If you do not have a primary care provider, please call [phone] and they will assist you.        Care EveryWhere ID     This is your Care EveryWhere ID. This could be used by other organizations to access your [place] medical records  [id number]        Your Vitals Were     Pulse Pulse Oximetry                110 99%           Blood Pressure from Last 3 Encounters:   08/07/17 (!) 154/97   07/24/17 145/80   05/16/17 134/86    Weight from Last 3 Encounters:   07/24/17 56.7 kg (125 lb)   05/16/17 56.2 kg (124 lb)   03/02/16 56.2 kg (124 lb)              We Performed the Following     BIOPSY SKIN/SUBQ/MUC MEM, SINGLE LESION        Primary Care Provider Office Phone # Fax #    Lora Lona Rivera [phone]       [address] [address]        Equal Access to Services     [facility] : Hadii juwan conde hadasho Soomaali, waaxda luqadaha, qaybta kaalmada aderayna, lana archuleta . So [facility] [phone].    ATENCIÓN: Si habla español, tiene a doty disposición servicios gratuitos de asistencia lingüística. Llame al [phone].    We comply with applicable federal civil rights laws and [place] laws. We do not discriminate on the basis of race, color, national origin, age, disability sex, sexual orientation or gender identity.            Thank you!     Thank you for choosing [facility]  for your care. Our goal is always to provide you  with excellent care. Hearing back from our patients is one way we can continue to improve our services. Please take a few minutes to complete the written survey that you may receive in the mail after your visit with us. Thank you!             Your Updated Medication List - Protect others around you: Learn how to safely use, store and throw away your medicines at www.disposemymeds.org.          This list is accurate as of: 8/7/17 12:09 PM.  Always use your most recent med list.                   Brand Name Dispense Instructions for use Diagnosis    aspirin 81 MG tablet      Take 81 mg by mouth daily        COPAXONE 20 MG/ML injection   Generic drug:  glatiramer      Inject 40 mg Subcutaneous every other day        cyanocobalamin 1000 MCG tablet    vitamin  B-12     Take 1,000 mcg by mouth daily        HYDROcodone-acetaminophen 5-325 MG per tablet    NORCO    10 tablet    Take 1-2 tablets by mouth every 6 hours as needed for moderate to severe pain        IBUPROFEN PO      Take 800 mg by mouth every 8 hours as needed for moderate pain        magnesium 100 MG Caps      Take 1 tablet by mouth daily Pt unsure of MG        vitamin D 2000 UNITS tablet      Take 1 tablet by mouth daily.        Zinc 100 MG Tabs      Take 1 tablet by mouth daily

## 2017-08-07 NOTE — MR AVS SNAPSHOT
After Visit Summary   8/7/2017    Devika Laird    MRN: [id number]           Patient Information     Date Of Birth          1965        Visit Information        Provider Department      8/7/2017 1:00 PM Montana Parker MD [facility]        Today's Diagnoses     Basal cell carcinoma of back    -  1       Follow-ups after your visit        Your next 10 appointments already scheduled     Aug 07, 2017  1:00 PM CDT   Return Visit with Montana Parker MD   [facility] ([facility]    [address] [place] [id number] [phone]              Who to contact     If you have questions or need follow up information about today's clinic visit or your schedule please contact [facility] directly at [phone].  Normal or non-critical lab and imaging results will be communicated to you by MyChart, letter or phone within 4 business days after the clinic has received the results. If you do not hear from us within 7 days, please contact the clinic through MyChart or phone. If you have a critical or abnormal lab result, we will notify you by phone as soon as possible.  Submit refill requests through [email or web address] or call your pharmacy and they will forward the refill request to us. Please allow 3 business days for your refill to be completed.          Additional Information About Your Visit        MyChart Information     [email or web address] gives you secure access to your electronic health record. If you see a primary care provider, you can also send messages to your care team and make appointments. If you have questions, please call your primary care clinic.  If you do not have a primary care provider, please call [phone] and they will assist you.        Care EveryWhere ID     This is your Care EveryWhere ID. This could be used by other organizations to access your [place] medical records  [id number]         Blood Pressure from Last  3 Encounters:   08/07/17 (!) 154/97   07/24/17 145/80   05/16/17 134/86    Weight from Last 3 Encounters:   07/24/17 56.7 kg (125 lb)   05/16/17 56.2 kg (124 lb)   03/02/16 56.2 kg (124 lb)              We Performed the Following     CL FROZEN SECTION FIRST SPEC        Primary Care Provider Office Phone # Fax #    Lora Lona Rivera [phone]       [facility] [address] [facility] [address]        Equal Access to Services     [facility]: Hadii juwan Wilson, wajocelynn cuba, tony abdalla, lana archuleta . So [facility] [phone].    ATENCIÓN: Si habla español, tiene a doty disposición servicios gratuitos de asistencia lingüística. [facility] [phone].    We comply with applicable federal civil rights laws and [place] laws. We do not discriminate on the basis of race, color, national origin, age, disability sex, sexual orientation or gender identity.            Thank you!     Thank you for choosing [facility]  for your care. Our goal is always to provide you with excellent care. Hearing back from our patients is one way we can continue to improve our services. Please take a few minutes to complete the written survey that you may receive in the mail after your visit with us. Thank you!             Your Updated Medication List - Protect others around you: Learn how to safely use, store and throw away your medicines at www.disposemymeds.org.          This list is accurate as of: 8/7/17 12:48 PM.  Always use your most recent med list.                   Brand Name Dispense Instructions for use Diagnosis    aspirin 81 MG tablet      Take 81 mg by mouth daily        COPAXONE 20 MG/ML injection   Generic drug:  glatiramer      Inject 40 mg Subcutaneous every other day        cyanocobalamin 1000 MCG tablet    vitamin  B-12     Take 1,000 mcg by mouth daily        HYDROcodone-acetaminophen 5-325 MG per tablet    NORCO    10  tablet    Take 1-2 tablets by mouth every 6 hours as needed for moderate to severe pain        IBUPROFEN PO      Take 800 mg by mouth every 8 hours as needed for moderate pain        magnesium 100 MG Caps      Take 1 tablet by mouth daily Pt unsure of MG        vitamin D 2000 UNITS tablet      Take 1 tablet by mouth daily.        Zinc 100 MG Tabs      Take 1 tablet by mouth daily

## 2017-08-07 NOTE — PATIENT INSTRUCTIONS
Wound Care Instructions     FOR SUPERFICIAL WOUNDS     [facility] [phone]    [facility] [phone]                       AFTER 24 HOURS YOU SHOULD REMOVE THE BANDAGE AND BEGIN DAILY DRESSING CHANGES AS FOLLOWS:     1) Remove Dressing.     2) Clean and dry the area with tap water using a Q-tip or sterile gauze pad.     3) Apply Vaseline, Aquaphor, Polysporin ointment or Bacitracin ointment over entire wound.  Do NOT use Neosporin ointment.     4) Cover the wound with a band-aid, or a sterile non-stick gauze pad and micropore paper tape      REPEAT THESE INSTRUCTIONS AT LEAST ONCE A DAY UNTIL THE WOUND HAS COMPLETELY HEALED.    It is an old wives tale that a wound heals better when it is exposed to air and allowed to dry out. The wound will heal faster with a better cosmetic result if it is kept moist with ointment and covered with a bandage.    **Do not let the wound dry out.**      Supplies Needed:      *Cotton tipped applicators (Q-tips)    *Polysporin Ointment or Bacitracin Ointment (NOT NEOSPORIN)    *Band-aids or non-stick gauze pads and micropore paper tape.      PATIENT INFORMATION:    During the healing process you will notice a number of changes. All wounds develop a small halo of redness surrounding the wound.  This means healing is occurring. Severe itching with extensive redness usually indicates sensitivity to the ointment or bandage tape used to dress the wound.  You should call our office if this develops.      Swelling  and/or discoloration around your surgical site is common, particularly when performed around the eye.    All wounds normally drain.  The larger the wound the more drainage there will be.  After 7-10 days, you will notice the wound beginning to shrink and new skin will begin to grow.  The wound is healed when you can see skin has formed over the entire area.  A healed wound has a healthy, shiny look to the surface and is red to dark pink in color  to normalize.  Wounds may take approximately 4-6 weeks to heal.  Larger wounds may take 6-8 weeks.  After the wound is healed you may discontinue dressing changes.    You may experience a sensation of tightness as your wound heals. This is normal and will gradually subside.    Your healed wound may be sensitive to temperature changes. This sensitivity improves with time, but if you re having a lot of discomfort, try to avoid temperature extremes.    Patients frequently experience itching after their wound appears to have healed because of the continue healing under the skin.  Plain Vaseline will help relieve the itching.        POSSIBLE COMPLICATIONS    BLEEDIN. Leave the bandage in place.  2. Use tightly rolled up gauze or a cloth to apply direct pressure over the bandage for 30  minutes.  3. Reapply pressure for an additional 30 minutes if necessary  4. Use additional gauze and tape to maintain pressure once the bleeding has stopped.

## 2017-08-07 NOTE — PROGRESS NOTES
Back:Orthokeratosis of epidermis with a proliferation of nests of basaloid cells, with peripheral palisading and a haphazard arrangement in the center extending into the dermis, forming nodules.  The tumor cells have hyperchromatic nuclei. Poor cytoplasm and intercellular bridging.      Back basal cell carcinoma schedule excision

## 2017-08-07 NOTE — TELEPHONE ENCOUNTER
----- Message from Montana Parker MD sent at 8/7/2017 12:48 PM CDT -----  Back basal cell carcinoma schedule excision

## 2017-08-21 ENCOUNTER — TRANSFERRED RECORDS (OUTPATIENT)
Dept: HEALTH INFORMATION MANAGEMENT | Facility: CLINIC | Age: 52
End: 2017-08-21

## 2017-08-28 ENCOUNTER — OFFICE VISIT (OUTPATIENT)
Dept: DERMATOLOGY | Facility: CLINIC | Age: 52
End: 2017-08-28
Payer: COMMERCIAL

## 2017-08-28 VITALS
BODY MASS INDEX: 20.83 KG/M2 | SYSTOLIC BLOOD PRESSURE: 140 MMHG | HEIGHT: 65 IN | DIASTOLIC BLOOD PRESSURE: 94 MMHG | RESPIRATION RATE: 16 BRPM | WEIGHT: 125 LBS | HEART RATE: 100 BPM

## 2017-08-28 DIAGNOSIS — C44.519 BASAL CELL CARCINOMA OF BACK: Primary | ICD-10-CM

## 2017-08-28 PROCEDURE — 88331 PATH CONSLTJ SURG 1 BLK 1SPC: CPT | Performed by: DERMATOLOGY

## 2017-08-28 PROCEDURE — 11602 EXC TR-EXT MAL+MARG 1.1-2 CM: CPT | Performed by: DERMATOLOGY

## 2017-08-28 NOTE — NURSING NOTE
"Chief Complaint   Patient presents with     Derm Problem     MOHS       Initial BP (!) 140/94 (BP Location: Right arm, Patient Position: Chair, Cuff Size: Adult Regular)  Pulse 100  Resp 16  Ht 1.638 m (5' 4.5\")  Wt 56.7 kg (125 lb)  BMI 21.12 kg/m2 Estimated body mass index is 21.12 kg/(m^2) as calculated from the following:    Height as of this encounter: 1.638 m (5' 4.5\").    Weight as of this encounter: 56.7 kg (125 lb).  BP completed using cuff size: regular      Sybil King CMA     "

## 2017-08-28 NOTE — MR AVS SNAPSHOT
After Visit Summary   2017    Devika Laird    MRN: [id number]           Patient Information     Date Of Birth          1965        Visit Information        Provider Department      2017 8:15 AM Montana Parker MD [facility]        Today's Diagnoses     Basal cell carcinoma of back    -  1      Care Instructions    Open Wound Care     for ____Back__________        ? No strenuous activity for 48 hours    ? Take Tylenol as needed for discomfort.                                                .         ? Do not drink alcoholic beverages for 48 hours.    ? Keep the pressure bandage in place for 24 hours. If the bandage becomes blood tinged or loose, reinforce it with gauze and tape.        (Refer to the reverse side of this page for management of bleeding).    ? Remove bandage in 24 hours and begin wound care as follows:     1. Clean area with tap water using a Q tip or gauze pad, (shower / bathe normally)  2. Dry wound with Q tip or gauze pad  3. Apply Aquaphor, Vaseline, Polysporin or Bacitracin Ointment with a Q tip    Do NOT use Neosporin Ointment *  4. Cover the wound with a band-aid or nonstick gauze pad and paper tape.  5. Repeat wound care once a day until wound is completely healed.    It is an old wives tale that a wound heals better when it is exposed to air and allowed to dry out. The wound will heal faster with a better cosmetic result if it is kept moist with ointment and covered with a bandage.  Do not let the wound dry out.      Supplies Needed:                Qtips or gauze pads                Polysporin or Bacitracin Ointment                Bandaids or nonstick gauze pads and paper tape    Wound care kits and brown paper tape are available for purchase at   the pharmacy.    BLEEDIN. Use tightly rolled up gauze or cloth to apply direct pressure over the bandage for 20   minutes.  2. Reapply pressure for an additional 20 minutes if  necessary  3. Call the office or go to the nearest emergency room if pressure fails to stop the bleeding.  4. Use additional gauze and tape to maintain pressure once the bleeding has stopped.  5. Begin wound care 24 hours after surgery as directed.                  WOUND HEALING    1. One week after surgery a pink / red halo will form around the outside of the wound.   This is new skin.  2. The center of the wound will appear yellowish white and produce some drainage.  3. The pink halo will slowly migrate in toward the center of the wound until the wound is covered with new shiny pink skin.  4. There will be no more drainage when the wound is completely healed.  5. It will take six months to one year for the redness to fade.  6. The scar may be itchy, tight and sensitive to extreme temperatures for a year after the surgery.  7. Massaging the area several times a day for several minutes after the wound is completely healed will help the scar soften and normalize faster. Begin massage only after healing is complete.      In case of emergency call: Dr Parker: [phone]     [facility]: [phone]    [facility]: [phone]              Follow-ups after your visit        Follow-up notes from your care team     Return in about 6 months (around 2018) for Skin Check.      Who to contact     If you have questions or need follow up information about today's clinic visit or your schedule please contact [facility] directly at [phone].  Normal or non-critical lab and imaging results will be communicated to you by MyChart, letter or phone within 4 business days after the clinic has received the results. If you do not hear from us within 7 days, please contact the clinic through [employer] or phone. If you have a critical or abnormal lab result, we will notify you by phone as soon as possible.  Submit refill requests through [employer] or call your pharmacy and they will forward the  "refill request to us. Please allow 3 business days for your refill to be completed.          Additional Information About Your Visit        MyChart Information     [employer] gives you secure access to your electronic health record. If you see a primary care provider, you can also send messages to your care team and make appointments. If you have questions, please call your primary care clinic.  If you do not have a primary care provider, please call [phone] and they will assist you.        Care EveryWhere ID     This is your Care EveryWhere ID. This could be used by other organizations to access your [place] medical records  [id number]        Your Vitals Were     Pulse Respirations Height BMI (Body Mass Index)          100 16 1.638 m (5' 4.5\") 21.12 kg/m2         Blood Pressure from Last 3 Encounters:   08/28/17 (!) 140/94   08/07/17 (!) 154/97   07/24/17 145/80    Weight from Last 3 Encounters:   08/28/17 56.7 kg (125 lb)   07/24/17 56.7 kg (125 lb)   05/16/17 56.2 kg (124 lb)              We Performed the Following     CL FROZEN SECTION FIRST SPEC     EXC MALIG SKIN LESION TRUNK/ARM/LEG 1.1-2.0 CM        Primary Care Provider Office Phone # Fax #    Lora Lona Rivera [phone]       [address] [address]        Equal Access to Services     [facility]: Hadii juwan Wilson, waaxda luqadaha, qaybta kaalmada lianne, lana archuleta . So [facility] [phone].    ATENCIÓN: Si habla español, tiene a doty disposición servicios gratuitos de asistencia lingüística. Miguelangel ricketts [phone].    We comply with applicable federal civil rights laws and [place] laws. We do not discriminate on the basis of race, color, national origin, age, disability sex, sexual orientation or gender identity.            Thank you!     Thank you for choosing [facility]  for your care. Our goal is always to provide you with excellent " care. Hearing back from our patients is one way we can continue to improve our services. Please take a few minutes to complete the written survey that you may receive in the mail after your visit with us. Thank you!             Your Updated Medication List - Protect others around you: Learn how to safely use, store and throw away your medicines at www.disposemymeds.org.          This list is accurate as of: 8/28/17 11:40 AM.  Always use your most recent med list.                   Brand Name Dispense Instructions for use Diagnosis    aspirin 81 MG tablet      Take 81 mg by mouth daily        COPAXONE 20 MG/ML injection   Generic drug:  glatiramer      Inject 40 mg Subcutaneous every other day        cyanocobalamin 1000 MCG tablet    vitamin  B-12     Take 1,000 mcg by mouth daily        IBUPROFEN PO      Take 800 mg by mouth every 8 hours as needed for moderate pain        magnesium 100 MG Caps      Take 1 tablet by mouth daily Pt unsure of MG        vitamin D 2000 UNITS tablet      Take 1 tablet by mouth daily.        Zinc 100 MG Tabs      Take 1 tablet by mouth daily

## 2017-08-28 NOTE — NURSING NOTE
Surgical Office Location :   [facility]  [address] [place] [address]

## 2017-08-28 NOTE — PROGRESS NOTES
Devika Laird is a 51 year old year old female patient here today for evaluation and managment of basal cell carcinoma on back. Patient reports the following modifying factors none.  Associated symptoms: none.  Patient has no other skin complaints today.  Remainder of the HPI, Meds, PMH, Allergies, FH, and SH was reviewed in chart.      Past Medical History:   Diagnosis Date     Mediastinal adenopathy      Multiple scleroses      Sprain of neck     Cervical strain       Past Surgical History:   Procedure Laterality Date     D & C       ENDOBRONCHIAL ULTRASOUND FLEXIBLE  9/16/2013    Procedure: ENDOBRONCHIAL ULTRASOUND FLEXIBLE;  WITH FNA;  Surgeon: Nathan Bucio MD;  Location:  GI     SURGICAL HISTORY OF -       cyst on jawline        Family History   Problem Relation Age of Onset     Breast Cancer Mother      53 year when diagnosed     Gynecology Mother      Thyroid Disease Mother      HEART DISEASE Mother      valve issue/takes no med     Lipids Mother      DIABETES Maternal Grandmother      HEART DISEASE Maternal Grandmother      CEREBROVASCULAR DISEASE Maternal Grandmother      Arthritis Maternal Grandmother      Hypertension Father      Musculoskeletal Disorder Father      GASTROINTESTINAL DISEASE Paternal Grandmother      Arthritis Paternal Grandmother      GASTROINTESTINAL DISEASE Paternal Grandfather      CANCER Paternal Grandfather      skin cancer     Hypertension Maternal Grandfather      Allergies Brother      Breast Cancer Maternal Aunt        Social History     Social History     Marital status:      Spouse name: N/A     Number of children: N/A     Years of education: N/A     Occupational History      [employer]     Social History Main Topics     Smoking status: Former Smoker     Packs/day: 0.10     Years: 20.00     Types: Cigarettes     Quit date: 8/10/2013     Smokeless tobacco: Never Used      Comment: 2-3 cigs per day off and on 3/2013     Alcohol use No     Drug use: No      Sexual activity: Yes     Partners: Male     Birth control/ protection: Condom     Other Topics Concern     Caffeine Concern No     Exercise Yes     Seat Belt Yes     Parent/Sibling W/ Cabg, Mi Or Angioplasty Before 65f 55m? No     Social History Narrative    The patient has a 5+ pk yr tobacco hx.  She has no active use.  Last tobacco use 08/2013.  Alcohol use is 7 alcoholic drinks per week.  She denies use of recreational drugs.          She is employed as a .          The patient is .  Has 2 children.          Hot Tube Exposure:  No    Recent Travel: [place] last winter as of 9/10/2013    Hx of incarceration:  No    Bird Exposure:  No    Other Animal Exposure: Dog, short hair    Asbestos Exposure:  None                   Outpatient Encounter Prescriptions as of 8/28/2017   Medication Sig Dispense Refill     aspirin 81 MG tablet Take 81 mg by mouth daily       IBUPROFEN PO Take 800 mg by mouth every 8 hours as needed for moderate pain       COPAXONE 20 MG/ML injection Inject 40 mg Subcutaneous every other day   10     magnesium 100 MG CAPS Take 1 tablet by mouth daily Pt unsure of MG       cyanocolbalamin (VITAMIN  B-12) 1000 MCG tablet Take 1,000 mcg by mouth daily        Cholecalciferol (VITAMIN D) 2000 UNIT tablet Take 1 tablet by mouth daily.       [DISCONTINUED] HYDROcodone-acetaminophen (NORCO) 5-325 MG per tablet Take 1-2 tablets by mouth every 6 hours as needed for moderate to severe pain 10 tablet 0     Zinc 100 MG TABS Take 1 tablet by mouth daily        No facility-administered encounter medications on file as of 8/28/2017.              Review Of Systems  Skin: As above  Eyes: negative  Ears/Nose/Throat: negative  Respiratory: No shortness of breath, dyspnea on exertion, cough, or hemoptysis  Cardiovascular: negative  Gastrointestinal: negative  Genitourinary: negative  Musculoskeletal: negative  Neurologic: negative  Psychiatric: negative  Hematologic/Lymphatic/Immunologic:  "negative  Endocrine: negative      O:   NAD, WDWN, Alert & Oriented, Mood & Affect wnl, Vitals stable   Here today alone   BP (!) 140/94 (BP Location: Right arm, Patient Position: Chair, Cuff Size: Adult Regular)  Pulse 100  Resp 16  Ht 1.638 m (5' 4.5\")  Wt 56.7 kg (125 lb)  BMI 21.12 kg/m2   General appearance normal   Vitals stable   Alert, oriented and in no acute distress     L lower back 7mm pink pearly papule       Eyes: Conjunctivae/lids:Normal     ENT: Lips, buccal mucosa, tongue: normal    MSK:Normal    Cardiovascular: peripheral edema none    Pulm: Breathing Normal    Neuro/Psych: Orientation:Normal; Mood/Affect:Normal      MICRO:   L lower back:Unremarkable epidermis with parallel bundles of cellular collagen within the superficial dermis.  No concerning areas for malignancy.     A/P:  1. L lower back basal cell carcinoma   EXCISION OF BASAL CELL CARCINOMA, Margins confirmed with FROZEN SECTIONS AND Second intent: After thorough discussion of PGACAC, consent obtained, anesthesia and prep, the margins of the lesion were identified and an elliptical incision was made encompassing the lesion with 4mm margin. The incisions were made through the skin and down to and including the superficial dermis.  The lesion was removed en bloc and submitted for frozen section pathologic review. Clear margins obtained (1.5cm).    REPAIR SECOND INTENT: We discussed the options for wound management in full with the patient including risks/benefits/possible outcomes. Decision made to allow the wound to heal by second intention. EBL minimal; complications none; wound care routine.  The patient was discharged in good condition and will return in one month or prn for wound evaluation.       BENIGN LESIONS DISCUSSED WITH PATIENT:  I discussed the specifics of tumor, prognosis, and genetics of benign lesions.  I explained that treatment of these lesions would be purely cosmetic and not medically neccessary.  I discussed with " patient different removal options including excision, cautery and /or laser.      Nature and genetics of benign skin lesions dicussed with patient.  Signs and Symptoms of skin cancer discussed with patient.  Patient encouraged to perform monthly skin exams.  UV precautions reviewed with patient.  Skin care regimen reviewed with patient: Eliminate harsh soaps, i.e. Dial, zest, irsih spring; Mild soaps such as Cetaphil or Dove sensitive skin, avoid hot or cold showers, aggressive use of emollients including vanicream, cetaphil or cerave discussed with patient.    Risks of non-melanoma skin cancer discussed with patient   Return to clinic 6 monthbs

## 2017-08-28 NOTE — PATIENT INSTRUCTIONS
Open Wound Care     for ____Back__________        ? No strenuous activity for 48 hours    ? Take Tylenol as needed for discomfort.                                                .         ? Do not drink alcoholic beverages for 48 hours.    ? Keep the pressure bandage in place for 24 hours. If the bandage becomes blood tinged or loose, reinforce it with gauze and tape.        (Refer to the reverse side of this page for management of bleeding).    ? Remove bandage in 24 hours and begin wound care as follows:     1. Clean area with tap water using a Q tip or gauze pad, (shower / bathe normally)  2. Dry wound with Q tip or gauze pad  3. Apply Aquaphor, Vaseline, Polysporin or Bacitracin Ointment with a Q tip    Do NOT use Neosporin Ointment *  4. Cover the wound with a band-aid or nonstick gauze pad and paper tape.  5. Repeat wound care once a day until wound is completely healed.    It is an old wives tale that a wound heals better when it is exposed to air and allowed to dry out. The wound will heal faster with a better cosmetic result if it is kept moist with ointment and covered with a bandage.  Do not let the wound dry out.      Supplies Needed:                Qtips or gauze pads                Polysporin or Bacitracin Ointment                Bandaids or nonstick gauze pads and paper tape    Wound care kits and brown paper tape are available for purchase at   the pharmacy.    BLEEDIN. Use tightly rolled up gauze or cloth to apply direct pressure over the bandage for 20   minutes.  2. Reapply pressure for an additional 20 minutes if necessary  3. Call the office or go to the nearest emergency room if pressure fails to stop the bleeding.  4. Use additional gauze and tape to maintain pressure once the bleeding has stopped.  5. Begin wound care 24 hours after surgery as directed.                  WOUND HEALING    1. One week after surgery a pink / red halo will form around the outside of the wound.   This is new  skin.  2. The center of the wound will appear yellowish white and produce some drainage.  3. The pink halo will slowly migrate in toward the center of the wound until the wound is covered with new shiny pink skin.  4. There will be no more drainage when the wound is completely healed.  5. It will take six months to one year for the redness to fade.  6. The scar may be itchy, tight and sensitive to extreme temperatures for a year after the surgery.  7. Massaging the area several times a day for several minutes after the wound is completely healed will help the scar soften and normalize faster. Begin massage only after healing is complete.      In case of emergency call: Dr Parker: [phone]     [facility]: [phone]    [facility]: [phone]

## 2017-08-31 ENCOUNTER — TELEPHONE (OUTPATIENT)
Dept: DERMATOLOGY | Facility: CLINIC | Age: 52
End: 2017-08-31

## 2017-08-31 NOTE — TELEPHONE ENCOUNTER
Looks like it was left open to heal, in that case can go swimming earlier. Typically I recommend waiting a 1-2 weeks before swimming in a pool.

## 2017-08-31 NOTE — TELEPHONE ENCOUNTER
Spoke to pt and notified of note below. Pt verbalized understanding.   Brina MCCABE RN BSN PHN  [facility]

## 2017-08-31 NOTE — TELEPHONE ENCOUNTER
Reason for Call:  Other call back    Detailed comments: pt calling stating she had MOHs done on 8/28. She would like to know if she can go swimming in a chlorinated pool?    Phone Number Patient can be reached at: Home number on file [phone] (home)    Best Time: any     Can we leave a detailed message on this number? YES    Call taken on 8/31/2017 at 10:54 AM by Thuy Donnelly

## 2018-06-19 ENCOUNTER — TRANSFERRED RECORDS (OUTPATIENT)
Dept: HEALTH INFORMATION MANAGEMENT | Facility: CLINIC | Age: 53
End: 2018-06-19

## 2018-07-07 ENCOUNTER — HEALTH MAINTENANCE LETTER (OUTPATIENT)
Age: 53
End: 2018-07-07

## 2019-11-08 ENCOUNTER — OFFICE VISIT (OUTPATIENT)
Dept: DERMATOLOGY | Facility: CLINIC | Age: 54
End: 2019-11-08
Payer: COMMERCIAL

## 2019-11-08 VITALS — DIASTOLIC BLOOD PRESSURE: 93 MMHG | HEART RATE: 110 BPM | OXYGEN SATURATION: 100 % | SYSTOLIC BLOOD PRESSURE: 139 MMHG

## 2019-11-08 DIAGNOSIS — Z85.828 HISTORY OF BASAL CELL CANCER: ICD-10-CM

## 2019-11-08 DIAGNOSIS — L81.4 LENTIGO: ICD-10-CM

## 2019-11-08 DIAGNOSIS — D22.9 MULTIPLE BENIGN NEVI: ICD-10-CM

## 2019-11-08 DIAGNOSIS — L82.1 SEBORRHEIC KERATOSIS: ICD-10-CM

## 2019-11-08 DIAGNOSIS — D48.5 NEOPLASM OF UNCERTAIN BEHAVIOR OF SKIN: Primary | ICD-10-CM

## 2019-11-08 DIAGNOSIS — D18.01 CHERRY ANGIOMA: ICD-10-CM

## 2019-11-08 PROCEDURE — 99214 OFFICE O/P EST MOD 30 MIN: CPT | Mod: 25 | Performed by: PHYSICIAN ASSISTANT

## 2019-11-08 PROCEDURE — 88305 TISSUE EXAM BY PATHOLOGIST: CPT | Mod: TC | Performed by: PHYSICIAN ASSISTANT

## 2019-11-08 PROCEDURE — 11102 TANGNTL BX SKIN SINGLE LES: CPT | Performed by: PHYSICIAN ASSISTANT

## 2019-11-08 NOTE — PATIENT INSTRUCTIONS
Wound Care Instructions     FOR SUPERFICIAL WOUNDS     [facility] [phone]    [facility] [phone]    back                   AFTER 24 HOURS YOU SHOULD REMOVE THE BANDAGE AND BEGIN DAILY DRESSING CHANGES AS FOLLOWS:     1) Remove Dressing.     2) Clean and dry the area with tap water using a Q-tip or sterile gauze pad.     3) Apply Vaseline, Aquaphor, Polysporin ointment or Bacitracin ointment over entire wound.  Do NOT use Neosporin ointment.     4) Cover the wound with a band-aid, or a sterile non-stick gauze pad and micropore paper tape      REPEAT THESE INSTRUCTIONS AT LEAST ONCE A DAY UNTIL THE WOUND HAS COMPLETELY HEALED.    It is an old wives tale that a wound heals better when it is exposed to air and allowed to dry out. The wound will heal faster with a better cosmetic result if it is kept moist with ointment and covered with a bandage.    **Do not let the wound dry out.**      Supplies Needed:      *Cotton tipped applicators (Q-tips)    *Polysporin Ointment or Bacitracin Ointment (NOT NEOSPORIN)    *Band-aids or non-stick gauze pads and micropore paper tape.      PATIENT INFORMATION:    During the healing process you will notice a number of changes. All wounds develop a small halo of redness surrounding the wound.  This means healing is occurring. Severe itching with extensive redness usually indicates sensitivity to the ointment or bandage tape used to dress the wound.  You should call our office if this develops.      Swelling  and/or discoloration around your surgical site is common, particularly when performed around the eye.    All wounds normally drain.  The larger the wound the more drainage there will be.  After 7-10 days, you will notice the wound beginning to shrink and new skin will begin to grow.  The wound is healed when you can see skin has formed over the entire area.  A healed wound has a healthy, shiny look to the surface and is red to dark pink in  color to normalize.  Wounds may take approximately 4-6 weeks to heal.  Larger wounds may take 6-8 weeks.  After the wound is healed you may discontinue dressing changes.    You may experience a sensation of tightness as your wound heals. This is normal and will gradually subside.    Your healed wound may be sensitive to temperature changes. This sensitivity improves with time, but if you re having a lot of discomfort, try to avoid temperature extremes.    Patients frequently experience itching after their wound appears to have healed because of the continue healing under the skin.  Plain Vaseline will help relieve the itching.        POSSIBLE COMPLICATIONS    BLEEDIN. Leave the bandage in place.  2. Use tightly rolled up gauze or a cloth to apply direct pressure over the bandage for 30  minutes.  3. Reapply pressure for an additional 30 minutes if necessary  4. Use additional gauze and tape to maintain pressure once the bleeding has stopped.

## 2019-11-08 NOTE — LETTER
11/8/2019         RE: Devika Laird  [address] [facility] [address]        Dear Colleague,    Thank you for referring your patient, Devika Laird, to the [facility]. Please see a copy of my visit note below.    Devika Laird is a 54 year old year old female patient here today for skin check. She notes spot on back, once treated with liquid nitrogen. Reports spot will be itchy at times. No bleeding.  Patient has no other skin complaints today.  Remainder of the HPI, Meds, PMH, Allergies, FH, and SH was reviewed in chart.    Pertinent Hx:  History of BCC  Past Medical History:   Diagnosis Date     Basal cell carcinoma      Mediastinal adenopathy      Multiple scleroses      Sprain of neck     Cervical strain       Past Surgical History:   Procedure Laterality Date     D & C       ENDOBRONCHIAL ULTRASOUND FLEXIBLE  9/16/2013    Procedure: ENDOBRONCHIAL ULTRASOUND FLEXIBLE;  WITH FNA;  Surgeon: Nathan Bucio MD;  Location:  GI     SURGICAL HISTORY OF -       cyst on jawline        Family History   Problem Relation Age of Onset     Breast Cancer Mother         53 year when diagnosed     Gynecology Mother      Thyroid Disease Mother      Heart Disease Mother         valve issue/takes no med     Lipids Mother      Diabetes Maternal Grandmother      Heart Disease Maternal Grandmother      Cerebrovascular Disease Maternal Grandmother      Arthritis Maternal Grandmother      Hypertension Father      Musculoskeletal Disorder Father      Gastrointestinal Disease Paternal Grandmother      Arthritis Paternal Grandmother      Gastrointestinal Disease Paternal Grandfather      Cancer Paternal Grandfather         skin cancer     Hypertension Maternal Grandfather      Allergies Brother      Breast Cancer Maternal Aunt        Social History     Socioeconomic History     Marital status:      Spouse name: Not on file     Number of children: Not on file     Years of education: Not on file      Highest education level: Not on file   Occupational History     Employer: [employer]   Social Needs     Financial resource strain: Not on file     Food insecurity:     Worry: Not on file     Inability: Not on file     Transportation needs:     Medical: Not on file     Non-medical: Not on file   Tobacco Use     Smoking status: Former Smoker     Packs/day: 0.10     Years: 20.00     Pack years: 2.00     Types: Cigarettes     Last attempt to quit: 8/10/2013     Years since quittin.2     Smokeless tobacco: Never Used     Tobacco comment: 2-3 cigs per day off and on 3/2013   Substance and Sexual Activity     Alcohol use: No     Alcohol/week: 1.7 standard drinks     Types: 2 Standard drinks or equivalent per week     Drug use: No     Sexual activity: Yes     Partners: Male     Birth control/protection: Condom   Lifestyle     Physical activity:     Days per week: Not on file     Minutes per session: Not on file     Stress: Not on file   Relationships     Social connections:     Talks on phone: Not on file     Gets together: Not on file     Attends Ep[employer] service: Not on file     Active member of club or organization: Not on file     Attends meetings of clubs or organizations: Not on file     Relationship status: Not on file     Intimate partner violence:     Fear of current or ex partner: Not on file     Emotionally abused: Not on file     Physically abused: Not on file     Forced sexual activity: Not on file   Other Topics Concern      Service Not Asked     Blood Transfusions Not Asked     Caffeine Concern No     Occupational Exposure Not Asked     Hobby Hazards Not Asked     Sleep Concern Not Asked     Stress Concern Not Asked     Weight Concern Not Asked     Special Diet Not Asked     Back Care Not Asked     Exercise Yes     Bike Helmet Not Asked     Seat Belt Yes     Self-Exams Not Asked     Parent/sibling w/ CABG, MI or angioplasty before 65F 55M? No   Social History Narrative    The patient  has a 5+ pk yr tobacco hx.  She has no active use.  Last tobacco use 08/2013.  Alcohol use is 7 alcoholic drinks per week.  She denies use of recreational drugs.          She is employed as a .          The patient is .  Has 2 children.          Hot Tube Exposure:  No    Recent Travel: [place] last winter as of 9/10/2013    Hx of incarceration:  No    Bird Exposure:  No    Other Animal Exposure: Dog, short hair    Asbestos Exposure:  None                   Outpatient Encounter Medications as of 11/8/2019   Medication Sig Dispense Refill     Cholecalciferol (VITAMIN D) 2000 UNIT tablet Take 1 tablet by mouth daily.       COPAXONE 20 MG/ML injection Inject 40 mg Subcutaneous every other day   10     cyanocolbalamin (VITAMIN  B-12) 1000 MCG tablet Take 1,000 mcg by mouth daily        IBUPROFEN PO Take 800 mg by mouth every 8 hours as needed for moderate pain       magnesium 100 MG CAPS Take 1 tablet by mouth daily Pt unsure of MG       Zinc 100 MG TABS Take 1 tablet by mouth daily        aspirin 81 MG tablet Take 81 mg by mouth daily       No facility-administered encounter medications on file as of 11/8/2019.              Review Of Systems  Skin: As above  Eyes: negative  Ears/Nose/Throat: negative  Respiratory: No shortness of breath, dyspnea on exertion, cough, or hemoptysis  Cardiovascular: negative  Gastrointestinal: negative  Genitourinary: negative  Musculoskeletal: negative  Neurologic: negative  Psychiatric: negative  Hematologic/Lymphatic/Immunologic: negative  Endocrine: negative      O:   NAD, WDWN, Alert & Oriented, Mood & Affect wnl, Vitals stable   Here today alone   BP (!) 131/102 (BP Location: Right arm, Patient Position: Sitting, Cuff Size: Adult Regular)   Pulse 110   SpO2 100%    General appearance normal   Vitals stable   Alert, oriented and in no acute distress     1.5 cm pink scaly plaque on right mid back   Stuck on papules and brown macules on trunk and ext   Red papules on  trunk  Well healed scar on left low back  Brown papules and macules with regular pigment network and borders on torso and extremities     The remainder of the detailed exam was unremarkable; the following areas were examined:  scalp/hair, conjunctiva/lids, face, neck, lips/teeth, oral mucosa/gingiva, chest, back, abdomen, buttocks, digits/nails, RUE, LUE, RLE, LLE.      Eyes: Conjunctivae/lids:Normal     ENT: Lips: normal    MSK:Normal    Cardiovascular: peripheral edema none    Pulm: Breathing Normal    Neuro/Psych: Orientation:Normal; Mood/Affect:Normal    A/P:  1. R/O ISK vs NMSC on right mid back  TANGENTIAL BIOPSY SENT OUT:  After consent, anesthesia with LEC and prep, tangential excision performed and specimen sent out for permanent section histology.  No complications and routine wound care. Patient told to call our office in 1-2 weeks for result.      2. Seborrheic keratosis, lentigo, angioma, benign nevi, History of BCC  BENIGN LESIONS DISCUSSED WITH PATIENT:  I discussed the specifics of tumor, prognosis, and genetics of benign lesions.  I explained that treatment of these lesions would be purely cosmetic and not medically neccessary.  I discussed with patient different removal options including excision, cautery and /or laser.      Nature and genetics of benign skin lesions dicussed with patient.  Signs and Symptoms of skin cancer discussed with patient.  ABCDEs of melanoma reviewed with patient.  Patient encouraged to perform monthly skin exams.  UV precautions reviewed with patient.  Risks of non-melanoma skin cancer discussed with patient   Return to clinic pending biopsy result or in one year for FBSE.      Again, thank you for allowing me to participate in the care of your patient.        Sincerely,        Alisia Adhikari PA-C

## 2019-11-08 NOTE — PROGRESS NOTES
Devika Laird is a 54 year old year old female patient here today for skin check. She notes spot on back, once treated with liquid nitrogen. Reports spot will be itchy at times. No bleeding.  Patient has no other skin complaints today.  Remainder of the HPI, Meds, PMH, Allergies, FH, and SH was reviewed in chart.    Pertinent Hx:  History of BCC  Past Medical History:   Diagnosis Date     Basal cell carcinoma      Mediastinal adenopathy      Multiple scleroses      Sprain of neck     Cervical strain       Past Surgical History:   Procedure Laterality Date     D & C       ENDOBRONCHIAL ULTRASOUND FLEXIBLE  9/16/2013    Procedure: ENDOBRONCHIAL ULTRASOUND FLEXIBLE;  WITH FNA;  Surgeon: Nathan Bucio MD;  Location:  GI     SURGICAL HISTORY OF -       cyst on jawline        Family History   Problem Relation Age of Onset     Breast Cancer Mother         53 year when diagnosed     Gynecology Mother      Thyroid Disease Mother      Heart Disease Mother         valve issue/takes no med     Lipids Mother      Diabetes Maternal Grandmother      Heart Disease Maternal Grandmother      Cerebrovascular Disease Maternal Grandmother      Arthritis Maternal Grandmother      Hypertension Father      Musculoskeletal Disorder Father      Gastrointestinal Disease Paternal Grandmother      Arthritis Paternal Grandmother      Gastrointestinal Disease Paternal Grandfather      Cancer Paternal Grandfather         skin cancer     Hypertension Maternal Grandfather      Allergies Brother      Breast Cancer Maternal Aunt        Social History     Socioeconomic History     Marital status:      Spouse name: Not on file     Number of children: Not on file     Years of education: Not on file     Highest education level: Not on file   Occupational History     Employer: [employer]   Social Needs     Financial resource strain: Not on file     Food insecurity:     Worry: Not on file     Inability: Not on file     Transportation  needs:     Medical: Not on file     Non-medical: Not on file   Tobacco Use     Smoking status: Former Smoker     Packs/day: 0.10     Years: 20.00     Pack years: 2.00     Types: Cigarettes     Last attempt to quit: 8/10/2013     Years since quittin.2     Smokeless tobacco: Never Used     Tobacco comment: 2-3 cigs per day off and on 3/2013   Substance and Sexual Activity     Alcohol use: No     Alcohol/week: 1.7 standard drinks     Types: 2 Standard drinks or equivalent per week     Drug use: No     Sexual activity: Yes     Partners: Male     Birth control/protection: Condom   Lifestyle     Physical activity:     Days per week: Not on file     Minutes per session: Not on file     Stress: Not on file   Relationships     Social connections:     Talks on phone: Not on file     Gets together: Not on file     Attends [employer] service: Not on file     Active member of club or organization: Not on file     Attends meetings of clubs or organizations: Not on file     Relationship status: Not on file     Intimate partner violence:     Fear of current or ex partner: Not on file     Emotionally abused: Not on file     Physically abused: Not on file     Forced sexual activity: Not on file   Other Topics Concern      Service Not Asked     Blood Transfusions Not Asked     Caffeine Concern No     Occupational Exposure Not Asked     Hobby Hazards Not Asked     Sleep Concern Not Asked     Stress Concern Not Asked     Weight Concern Not Asked     Special Diet Not Asked     Back Care Not Asked     Exercise Yes     Bike Helmet Not Asked     Seat Belt Yes     Self-Exams Not Asked     Parent/sibling w/ CABG, MI or angioplasty before 65F 55M? No   Social History Narrative    The patient has a 5+ pk yr tobacco hx.  She has no active use.  Last tobacco use 2013.  Alcohol use is 7 alcoholic drinks per week.  She denies use of recreational drugs.          She is employed as a .          The patient is .  Has 2  children.          Hot Tube Exposure:  No    Recent Travel: [place] last winter as of 9/10/2013    Hx of incarceration:  No    Bird Exposure:  No    Other Animal Exposure: Dog, short hair    Asbestos Exposure:  None                   Outpatient Encounter Medications as of 11/8/2019   Medication Sig Dispense Refill     Cholecalciferol (VITAMIN D) 2000 UNIT tablet Take 1 tablet by mouth daily.       COPAXONE 20 MG/ML injection Inject 40 mg Subcutaneous every other day   10     cyanocolbalamin (VITAMIN  B-12) 1000 MCG tablet Take 1,000 mcg by mouth daily        IBUPROFEN PO Take 800 mg by mouth every 8 hours as needed for moderate pain       magnesium 100 MG CAPS Take 1 tablet by mouth daily Pt unsure of MG       Zinc 100 MG TABS Take 1 tablet by mouth daily        aspirin 81 MG tablet Take 81 mg by mouth daily       No facility-administered encounter medications on file as of 11/8/2019.              Review Of Systems  Skin: As above  Eyes: negative  Ears/Nose/Throat: negative  Respiratory: No shortness of breath, dyspnea on exertion, cough, or hemoptysis  Cardiovascular: negative  Gastrointestinal: negative  Genitourinary: negative  Musculoskeletal: negative  Neurologic: negative  Psychiatric: negative  Hematologic/Lymphatic/Immunologic: negative  Endocrine: negative      O:   NAD, WDWN, Alert & Oriented, Mood & Affect wnl, Vitals stable   Here today alone   BP (!) 131/102 (BP Location: Right arm, Patient Position: Sitting, Cuff Size: Adult Regular)   Pulse 110   SpO2 100%    General appearance normal   Vitals stable   Alert, oriented and in no acute distress     1.5 cm pink scaly plaque on right mid back   Stuck on papules and brown macules on trunk and ext   Red papules on trunk  Well healed scar on left low back  Brown papules and macules with regular pigment network and borders on torso and extremities     The remainder of the detailed exam was unremarkable; the following areas were examined:  scalp/hair,  conjunctiva/lids, face, neck, lips/teeth, oral mucosa/gingiva, chest, back, abdomen, buttocks, digits/nails, RUE, LUE, RLE, LLE.      Eyes: Conjunctivae/lids:Normal     ENT: Lips: normal    MSK:Normal    Cardiovascular: peripheral edema none    Pulm: Breathing Normal    Neuro/Psych: Orientation:Normal; Mood/Affect:Normal    A/P:  1. R/O ISK vs NMSC on right mid back  TANGENTIAL BIOPSY SENT OUT:  After consent, anesthesia with LEC and prep, tangential excision performed and specimen sent out for permanent section histology.  No complications and routine wound care. Patient told to call our office in 1-2 weeks for result.      2. Seborrheic keratosis, lentigo, angioma, benign nevi, History of BCC  BENIGN LESIONS DISCUSSED WITH PATIENT:  I discussed the specifics of tumor, prognosis, and genetics of benign lesions.  I explained that treatment of these lesions would be purely cosmetic and not medically neccessary.  I discussed with patient different removal options including excision, cautery and /or laser.      Nature and genetics of benign skin lesions dicussed with patient.  Signs and Symptoms of skin cancer discussed with patient.  ABCDEs of melanoma reviewed with patient.  Patient encouraged to perform monthly skin exams.  UV precautions reviewed with patient.  Risks of non-melanoma skin cancer discussed with patient   Return to clinic pending biopsy result or in one year for FBSE.

## 2019-11-08 NOTE — NURSING NOTE
Chief Complaint   Patient presents with     Skin Check       Vitals:    11/08/19 0821   BP: (!) 131/102   BP Location: Right arm   Patient Position: Sitting   Cuff Size: Adult Regular   Pulse: 110   SpO2: 100%     Wt Readings from Last 1 Encounters:   08/28/17 56.7 kg (125 lb)       Adrianna Allen LPN.................11/8/2019

## 2019-11-11 LAB — COPATH REPORT: NORMAL

## 2020-01-20 NOTE — PROGRESS NOTES
Surgical Office Location :   [facility]  [address] [place] [address]

## 2020-01-21 ENCOUNTER — OFFICE VISIT (OUTPATIENT)
Dept: DERMATOLOGY | Facility: CLINIC | Age: 55
End: 2020-01-21
Payer: COMMERCIAL

## 2020-01-21 VITALS
HEART RATE: 110 BPM | BODY MASS INDEX: 21.46 KG/M2 | SYSTOLIC BLOOD PRESSURE: 162 MMHG | HEIGHT: 64 IN | DIASTOLIC BLOOD PRESSURE: 95 MMHG

## 2020-01-21 DIAGNOSIS — C44.519 BASAL CELL CARCINOMA (BCC) OF BACK: Primary | ICD-10-CM

## 2020-01-21 PROCEDURE — 17313 MOHS 1 STAGE T/A/L: CPT | Performed by: DERMATOLOGY

## 2020-01-21 PROCEDURE — 17314 MOHS ADDL STAGE T/A/L: CPT | Performed by: DERMATOLOGY

## 2020-01-21 NOTE — PATIENT INSTRUCTIONS
Open Wound Care     For BACK        ? No strenuous activity for 48 hours    ? Take Tylenol as needed for discomfort.                                                .         ? Do not drink alcoholic beverages for 48 hours.    ? Keep the pressure bandage in place for 24 hours. If the bandage becomes blood tinged or loose, reinforce it with gauze and tape.        (Refer to the reverse side of this page for management of bleeding).    ? Remove bandage in 24 hours and begin wound care as follows:     1. Clean area with tap water using a Q tip or gauze pad, (shower / bathe normally)  2. Dry wound with Q tip or gauze pad  3. Apply Aquaphor, Vaseline, Polysporin or Bacitracin Ointment with a Q tip    Do NOT use Neosporin Ointment *  4. Cover the wound with a band-aid or nonstick gauze pad and paper tape.  5. Repeat wound care once a day until wound is completely healed.    It is an old wives tale that a wound heals better when it is exposed to air and allowed to dry out. The wound will heal faster with a better cosmetic result if it is kept moist with ointment and covered with a bandage.  Do not let the wound dry out.      Supplies Needed:                Qtips or gauze pads                Polysporin or Bacitracin Ointment                Bandaids or nonstick gauze pads and paper tape    Wound care kits and brown paper tape are available for purchase at   the pharmacy.    BLEEDIN. Use tightly rolled up gauze or cloth to apply direct pressure over the bandage for 20   minutes.  2. Reapply pressure for an additional 20 minutes if necessary  3. Call the office or go to the nearest emergency room if pressure fails to stop the bleeding.  4. Use additional gauze and tape to maintain pressure once the bleeding has stopped.  5. Begin wound care 24 hours after surgery as directed.                  WOUND HEALING    1. One week after surgery a pink / red halo will form around the outside of the wound.   This is new skin.  2. The  center of the wound will appear yellowish white and produce some drainage.  3. The pink halo will slowly migrate in toward the center of the wound until the wound is covered with new shiny pink skin.  4. There will be no more drainage when the wound is completely healed.  5. It will take six months to one year for the redness to fade.  6. The scar may be itchy, tight and sensitive to extreme temperatures for a year after the surgery.  7. Massaging the area several times a day for several minutes after the wound is completely healed will help the scar soften and normalize faster. Begin massage only after healing is complete.      In case of emergency call: Dr Parker: [phone]     [facility]: [phone]    [facility]: [phone]

## 2020-01-21 NOTE — LETTER
1/21/2020         RE: Devika Laird  [address] [facility] [address]        Dear Colleague,    Thank you for referring your patient, Devika Laird, to the [facility]. Please see a copy of my visit note below.    Surgical Office Location :   [facility]  [address] [place] [address]      Devika Laird is a 54 year old year old female patient here today for evaluation and managment of basal cell carcinoma on right mid back.  Patient has no other skin complaints today.  Remainder of the HPI, Meds, PMH, Allergies, FH, and SH was reviewed in chart.      Past Medical History:   Diagnosis Date     Basal cell carcinoma      Mediastinal adenopathy      Multiple scleroses      Sprain of neck     Cervical strain       Past Surgical History:   Procedure Laterality Date     D & C       ENDOBRONCHIAL ULTRASOUND FLEXIBLE  9/16/2013    Procedure: ENDOBRONCHIAL ULTRASOUND FLEXIBLE;  WITH FNA;  Surgeon: Nathan Bucio MD;  Location:  GI     SURGICAL HISTORY OF -       cyst on jawline        Family History   Problem Relation Age of Onset     Breast Cancer Mother         53 year when diagnosed     Gynecology Mother      Thyroid Disease Mother      Heart Disease Mother         valve issue/takes no med     Lipids Mother      Diabetes Maternal Grandmother      Heart Disease Maternal Grandmother      Cerebrovascular Disease Maternal Grandmother      Arthritis Maternal Grandmother      Hypertension Father      Musculoskeletal Disorder Father      Gastrointestinal Disease Paternal Grandmother      Arthritis Paternal Grandmother      Gastrointestinal Disease Paternal Grandfather      Cancer Paternal Grandfather         skin cancer     Hypertension Maternal Grandfather      Allergies Brother      Breast Cancer Maternal Aunt        Social History     Socioeconomic History     Marital status:      Spouse name: Not on file     Number of children: Not on file     Years of  education: Not on file     Highest education level: Not on file   Occupational History     Employer: [employer]   Social Needs     Financial resource strain: Not on file     Food insecurity:     Worry: Not on file     Inability: Not on file     Transportation needs:     Medical: Not on file     Non-medical: Not on file   Tobacco Use     Smoking status: Former Smoker     Packs/day: 0.10     Years: 20.00     Pack years: 2.00     Types: Cigarettes     Last attempt to quit: 8/10/2013     Years since quittin.4     Smokeless tobacco: Never Used     Tobacco comment: 2-3 cigs per day off and on 3/2013   Substance and Sexual Activity     Alcohol use: No     Alcohol/week: 1.7 standard drinks     Types: 2 Standard drinks or equivalent per week     Drug use: No     Sexual activity: Yes     Partners: Male     Birth control/protection: Condom   Lifestyle     Physical activity:     Days per week: Not on file     Minutes per session: Not on file     Stress: Not on file   Relationships     Social connections:     Talks on phone: Not on file     Gets together: Not on file     Attends Confucianism service: Not on file     Active member of club or organization: Not on file     Attends meetings of clubs or organizations: Not on file     Relationship status: Not on file     Intimate partner violence:     Fear of current or ex partner: Not on file     Emotionally abused: Not on file     Physically abused: Not on file     Forced sexual activity: Not on file   Other Topics Concern      Service Not Asked     Blood Transfusions Not Asked     Caffeine Concern No     Occupational Exposure Not Asked     Hobby Hazards Not Asked     Sleep Concern Not Asked     Stress Concern Not Asked     Weight Concern Not Asked     Special Diet Not Asked     Back Care Not Asked     Exercise Yes     Bike Helmet Not Asked     Seat Belt Yes     Self-Exams Not Asked     Parent/sibling w/ CABG, MI or angioplasty before 65F 55M? No   Social History  "Narrative    The patient has a 5+ pk yr tobacco hx.  She has no active use.  Last tobacco use 08/2013.  Alcohol use is 7 alcoholic drinks per week.  She denies use of recreational drugs.          She is employed as a .          The patient is .  Has 2 children.          Hot Tube Exposure:  No    Recent Travel: [place] last winter as of 9/10/2013    Hx of incarceration:  No    Bird Exposure:  No    Other Animal Exposure: Dog, short hair    Asbestos Exposure:  None                   Outpatient Encounter Medications as of 1/21/2020   Medication Sig Dispense Refill     aspirin 81 MG tablet Take 81 mg by mouth daily       Cholecalciferol (VITAMIN D) 2000 UNIT tablet Take 1 tablet by mouth daily.       COPAXONE 20 MG/ML injection Inject 40 mg Subcutaneous every other day   10     cyanocolbalamin (VITAMIN  B-12) 1000 MCG tablet Take 1,000 mcg by mouth daily        IBUPROFEN PO Take 800 mg by mouth every 8 hours as needed for moderate pain       magnesium 100 MG CAPS Take 1 tablet by mouth daily Pt unsure of MG       Zinc 100 MG TABS Take 1 tablet by mouth daily        No facility-administered encounter medications on file as of 1/21/2020.              Review Of Systems  Skin: As above  Eyes: negative  Ears/Nose/Throat: negative  Respiratory: No shortness of breath, dyspnea on exertion, cough, or hemoptysis  Cardiovascular: negative  Gastrointestinal: negative  Genitourinary: negative  Musculoskeletal: negative  Neurologic: negative  Psychiatric: negative  Hematologic/Lymphatic/Immunologic: negative  Endocrine: negative      O:   NAD, WDWN, Alert & Oriented, Mood & Affect wnl, Vitals stable   Here today alone   BP (!) 162/95   Pulse 110   Ht 1.626 m (5' 4\")   BMI 21.46 kg/m      General appearance normal   Vitals stable   Alert, oriented and in no acute distress     R mid back 1.5cm red plaque      Eyes: Conjunctivae/lids:Normal     ENT: Lips, buccal mucosa, tongue: normal    MSK:Normal    Cardiovascular: " peripheral edema none    Pulm: Breathing Normal    Neuro/Psych: Orientation:Alert and Orientedx3 ; Mood/Affect:normal       A/P:  1. R mid back basal cell carcinoma   MOHS:   Size    After PGACAC discussed with patient, decision for Mohs surgery was made. Indication for Mohs was Size. Patient confirmed the site with Dr. Parker.  After anesthesia with LEC, the tumor was excised using standard Mohs technique in 3 stages(s).  CLEAR MARGINS OBTAINED and Final defect size was 2.7 x 2.9 cm.       REPAIR SECOND INTENT: We discussed the options for wound management in full with the patient including risks/benefits/possible outcomes. Decision made to allow the wound to heal by second intention. EBL minimal; complications none; wound care routine.  The patient was discharged in good condition and will return in one month or prn for wound evaluation.  BENIGN LESIONS DISCUSSED WITH PATIENT:  I discussed the specifics of tumor, prognosis, and genetics of benign lesions.  I explained that treatment of these lesions would be purely cosmetic and not medically neccessary.  I discussed with patient different removal options including excision, cautery and /or laser.      Nature and genetics of benign skin lesions dicussed with patient.  Signs and Symptoms of skin cancer discussed with patient.  ABCDEs of melanoma reviewed with patient.  Patient encouraged to perform monthly skin exams.  UV precautions reviewed with patient.  Patient to follow up with Primary Care provider regarding elevated blood pressure.  Skin care regimen reviewed with patient: Eliminate harsh soaps, i.e. Dial, zest, irsih spring; Mild soaps such as Cetaphil or Dove sensitive skin, avoid hot or cold showers, aggressive use of emollients including vanicream, cetaphil or cerave discussed with patient.    Risks of non-melanoma skin cancer discussed with patient   Return to clinic 6 months      Again, thank you for allowing me to participate in the care of your  patient.        Sincerely,        Montana Parker MD

## 2020-01-21 NOTE — NURSING NOTE
"Chief Complaint   Patient presents with     Derm Problem     mohs       Vitals:    01/21/20 0825   BP: (!) 162/95   Pulse: 110   Height: 1.626 m (5' 4\")     Wt Readings from Last 1 Encounters:   08/28/17 56.7 kg (125 lb)       Adrianna Allen LPN.................1/21/2020    "

## 2020-01-21 NOTE — PROGRESS NOTES
Devika Laird is a 54 year old year old female patient here today for evaluation and managment of basal cell carcinoma on right mid back.  Patient has no other skin complaints today.  Remainder of the HPI, Meds, PMH, Allergies, FH, and SH was reviewed in chart.      Past Medical History:   Diagnosis Date     Basal cell carcinoma      Mediastinal adenopathy      Multiple scleroses      Sprain of neck     Cervical strain       Past Surgical History:   Procedure Laterality Date     D & C       ENDOBRONCHIAL ULTRASOUND FLEXIBLE  9/16/2013    Procedure: ENDOBRONCHIAL ULTRASOUND FLEXIBLE;  WITH FNA;  Surgeon: Nathan Bucio MD;  Location:  GI     SURGICAL HISTORY OF -       cyst on jawline        Family History   Problem Relation Age of Onset     Breast Cancer Mother         53 year when diagnosed     Gynecology Mother      Thyroid Disease Mother      Heart Disease Mother         valve issue/takes no med     Lipids Mother      Diabetes Maternal Grandmother      Heart Disease Maternal Grandmother      Cerebrovascular Disease Maternal Grandmother      Arthritis Maternal Grandmother      Hypertension Father      Musculoskeletal Disorder Father      Gastrointestinal Disease Paternal Grandmother      Arthritis Paternal Grandmother      Gastrointestinal Disease Paternal Grandfather      Cancer Paternal Grandfather         skin cancer     Hypertension Maternal Grandfather      Allergies Brother      Breast Cancer Maternal Aunt        Social History     Socioeconomic History     Marital status:      Spouse name: Not on file     Number of children: Not on file     Years of education: Not on file     Highest education level: Not on file   Occupational History     Employer: [employer]   Social Needs     Financial resource strain: Not on file     Food insecurity:     Worry: Not on file     Inability: Not on file     Transportation needs:     Medical: Not on file     Non-medical: Not on file   Tobacco Use      Smoking status: Former Smoker     Packs/day: 0.10     Years: 20.00     Pack years: 2.00     Types: Cigarettes     Last attempt to quit: 8/10/2013     Years since quittin.4     Smokeless tobacco: Never Used     Tobacco comment: 2-3 cigs per day off and on 3/2013   Substance and Sexual Activity     Alcohol use: No     Alcohol/week: 1.7 standard drinks     Types: 2 Standard drinks or equivalent per week     Drug use: No     Sexual activity: Yes     Partners: Male     Birth control/protection: Condom   Lifestyle     Physical activity:     Days per week: Not on file     Minutes per session: Not on file     Stress: Not on file   Relationships     Social connections:     Talks on phone: Not on file     Gets together: Not on file     Attends Gnosticism service: Not on file     Active member of club or organization: Not on file     Attends meetings of clubs or organizations: Not on file     Relationship status: Not on file     Intimate partner violence:     Fear of current or ex partner: Not on file     Emotionally abused: Not on file     Physically abused: Not on file     Forced sexual activity: Not on file   Other Topics Concern      Service Not Asked     Blood Transfusions Not Asked     Caffeine Concern No     Occupational Exposure Not Asked     Hobby Hazards Not Asked     Sleep Concern Not Asked     Stress Concern Not Asked     Weight Concern Not Asked     Special Diet Not Asked     Back Care Not Asked     Exercise Yes     Bike Helmet Not Asked     Seat Belt Yes     Self-Exams Not Asked     Parent/sibling w/ CABG, MI or angioplasty before 65F 55M? No   Social History Narrative    The patient has a 5+ pk yr tobacco hx.  She has no active use.  Last tobacco use 2013.  Alcohol use is 7 alcoholic drinks per week.  She denies use of recreational drugs.          She is employed as a .          The patient is .  Has 2 children.          Hot Tube Exposure:  No    Recent Travel: [place] last winter  "as of 9/10/2013    Hx of incarceration:  No    Bird Exposure:  No    Other Animal Exposure: Dog, short hair    Asbestos Exposure:  None                   Outpatient Encounter Medications as of 1/21/2020   Medication Sig Dispense Refill     aspirin 81 MG tablet Take 81 mg by mouth daily       Cholecalciferol (VITAMIN D) 2000 UNIT tablet Take 1 tablet by mouth daily.       COPAXONE 20 MG/ML injection Inject 40 mg Subcutaneous every other day   10     cyanocolbalamin (VITAMIN  B-12) 1000 MCG tablet Take 1,000 mcg by mouth daily        IBUPROFEN PO Take 800 mg by mouth every 8 hours as needed for moderate pain       magnesium 100 MG CAPS Take 1 tablet by mouth daily Pt unsure of MG       Zinc 100 MG TABS Take 1 tablet by mouth daily        No facility-administered encounter medications on file as of 1/21/2020.              Review Of Systems  Skin: As above  Eyes: negative  Ears/Nose/Throat: negative  Respiratory: No shortness of breath, dyspnea on exertion, cough, or hemoptysis  Cardiovascular: negative  Gastrointestinal: negative  Genitourinary: negative  Musculoskeletal: negative  Neurologic: negative  Psychiatric: negative  Hematologic/Lymphatic/Immunologic: negative  Endocrine: negative      O:   NAD, WDWN, Alert & Oriented, Mood & Affect wnl, Vitals stable   Here today alone   BP (!) 162/95   Pulse 110   Ht 1.626 m (5' 4\")   BMI 21.46 kg/m     General appearance normal   Vitals stable   Alert, oriented and in no acute distress     R mid back 1.5cm red plaque      Eyes: Conjunctivae/lids:Normal     ENT: Lips, buccal mucosa, tongue: normal    MSK:Normal    Cardiovascular: peripheral edema none    Pulm: Breathing Normal    Neuro/Psych: Orientation:Alert and Orientedx3 ; Mood/Affect:normal       A/P:  1. R mid back basal cell carcinoma   MOHS:   Size    After PGACAC discussed with patient, decision for Mohs surgery was made. Indication for Mohs was Size. Patient confirmed the site with Dr. Parker.  After anesthesia " with [facility], the tumor was excised using standard Mohs technique in 3 stages(s).  CLEAR MARGINS OBTAINED and Final defect size was 2.7 x 2.9 cm.       REPAIR SECOND INTENT: We discussed the options for wound management in full with the patient including risks/benefits/possible outcomes. Decision made to allow the wound to heal by second intention. EBL minimal; complications none; wound care routine.  The patient was discharged in good condition and will return in one month or prn for wound evaluation.  BENIGN LESIONS DISCUSSED WITH PATIENT:  I discussed the specifics of tumor, prognosis, and genetics of benign lesions.  I explained that treatment of these lesions would be purely cosmetic and not medically neccessary.  I discussed with patient different removal options including excision, cautery and /or laser.      Nature and genetics of benign skin lesions dicussed with patient.  Signs and Symptoms of skin cancer discussed with patient.  ABCDEs of melanoma reviewed with patient.  Patient encouraged to perform monthly skin exams.  UV precautions reviewed with patient.  Patient to follow up with Primary Care provider regarding elevated blood pressure.  Skin care regimen reviewed with patient: Eliminate harsh soaps, i.e. Dial, zest, irsih spring; Mild soaps such as Cetaphil or Dove sensitive skin, avoid hot or cold showers, aggressive use of emollients including vanicream, cetaphil or cerave discussed with patient.    Risks of non-melanoma skin cancer discussed with patient   Return to clinic 6 months

## 2021-01-29 ENCOUNTER — TRANSFERRED RECORDS (OUTPATIENT)
Dept: HEALTH INFORMATION MANAGEMENT | Facility: CLINIC | Age: 56
End: 2021-01-29

## 2021-02-23 ENCOUNTER — HOSPITAL ENCOUNTER (OUTPATIENT)
Dept: MRI IMAGING | Facility: CLINIC | Age: 56
Discharge: HOME | End: 2021-02-23
Payer: COMMERCIAL

## 2021-02-23 DIAGNOSIS — G35 MULTIPLE SCLEROSIS (H): ICD-10-CM

## 2021-02-23 PROCEDURE — 70553 MRI BRAIN STEM W/O & W/DYE: CPT

## 2021-02-23 PROCEDURE — 255N000002 HC RX 255 OP 636

## 2021-02-23 PROCEDURE — A9585 GADOBUTROL INJECTION: HCPCS

## 2021-02-23 RX ORDER — GADOBUTROL 604.72 MG/ML
5.6 INJECTION INTRAVENOUS ONCE
Status: COMPLETED | OUTPATIENT
Start: 2021-02-23 | End: 2021-02-23

## 2021-02-23 RX ADMIN — GADOBUTROL 5.6 ML: 604.72 INJECTION INTRAVENOUS at 11:20

## 2021-05-11 NOTE — ED NOTES
MD at bedside, completing joint injection.  
Patient states she has a hx of bursitis in right shoulder. She has had increasing pain since Thursday taking Aleve and Motrin (perscription strength). This morning she was unable to move it. It is slightly warmer than left shoulder. She denies any trauma prior to this but states her daily job does aggravate her shoulder  
good, to achieve stated therapy goals

## 2023-04-01 ENCOUNTER — HOSPITAL ENCOUNTER (EMERGENCY)
Facility: CLINIC | Age: 58
Discharge: HOME | End: 2023-04-01
Attending: FAMILY MEDICINE | Admitting: FAMILY MEDICINE
Payer: COMMERCIAL

## 2023-04-01 ENCOUNTER — APPOINTMENT (OUTPATIENT)
Dept: CT IMAGING | Facility: CLINIC | Age: 58
End: 2023-04-01
Attending: FAMILY MEDICINE
Payer: COMMERCIAL

## 2023-04-01 VITALS
BODY MASS INDEX: 20.49 KG/M2 | HEART RATE: 80 BPM | SYSTOLIC BLOOD PRESSURE: 124 MMHG | DIASTOLIC BLOOD PRESSURE: 72 MMHG | OXYGEN SATURATION: 97 % | RESPIRATION RATE: 21 BRPM | WEIGHT: 120 LBS | HEIGHT: 64 IN | TEMPERATURE: 98 F

## 2023-04-01 DIAGNOSIS — R19.00 PELVIC MASS: ICD-10-CM

## 2023-04-01 DIAGNOSIS — D64.9 ANEMIA, UNSPECIFIED TYPE: ICD-10-CM

## 2023-04-01 LAB
ALBUMIN SERPL BCG-MCNC: 3.1 G/DL (ref 3.5–5.2)
ALBUMIN UR-MCNC: NEGATIVE MG/DL
ALP SERPL-CCNC: 146 U/L (ref 35–104)
ALT SERPL W P-5'-P-CCNC: 18 U/L (ref 10–35)
ANION GAP SERPL CALCULATED.3IONS-SCNC: 14 MMOL/L (ref 7–15)
APPEARANCE UR: ABNORMAL
AST SERPL W P-5'-P-CCNC: 37 U/L (ref 10–35)
BASOPHILS # BLD MANUAL: 0.1 10E3/UL (ref 0–0.2)
BASOPHILS NFR BLD MANUAL: 1 %
BILIRUB SERPL-MCNC: 0.6 MG/DL
BILIRUB UR QL STRIP: NEGATIVE
BUN SERPL-MCNC: 9 MG/DL (ref 8–23)
CALCIUM SERPL-MCNC: 8.3 MG/DL (ref 8.6–10)
CANCER AG125 SERPL-ACNC: 66 U/ML
CEA SERPL-MCNC: 1.9 NG/ML
CHLORIDE SERPL-SCNC: 103 MMOL/L (ref 98–107)
COLOR UR AUTO: YELLOW
CREAT SERPL-MCNC: 0.6 MG/DL (ref 0.51–0.95)
DEPRECATED HCO3 PLAS-SCNC: 23 MMOL/L (ref 22–29)
EOSINOPHIL # BLD MANUAL: 0 10E3/UL (ref 0–0.7)
EOSINOPHIL NFR BLD MANUAL: 0 %
ERYTHROCYTE [DISTWIDTH] IN BLOOD BY AUTOMATED COUNT: 17.9 % (ref 10–15)
FERRITIN SERPL-MCNC: 2343 NG/ML (ref 11–328)
GFR SERPL CREATININE-BSD FRML MDRD: >90 ML/MIN/1.73M2
GLUCOSE SERPL-MCNC: 128 MG/DL (ref 70–99)
GLUCOSE UR STRIP-MCNC: NEGATIVE MG/DL
HCT VFR BLD AUTO: 24.9 % (ref 35–47)
HGB BLD-MCNC: 8.1 G/DL (ref 11.7–15.7)
HGB UR QL STRIP: NEGATIVE
IRON BINDING CAPACITY (ROCHE): 313 UG/DL (ref 240–430)
IRON SATN MFR SERPL: 9 % (ref 15–46)
IRON SERPL-MCNC: 29 UG/DL (ref 37–145)
KETONES UR STRIP-MCNC: 5 MG/DL
LACTATE SERPL-SCNC: 1.2 MMOL/L (ref 0.7–2)
LEUKOCYTE ESTERASE UR QL STRIP: ABNORMAL
LIPASE SERPL-CCNC: 33 U/L (ref 13–60)
LYMPHOCYTES # BLD MANUAL: 1.1 10E3/UL (ref 0.8–5.3)
LYMPHOCYTES NFR BLD MANUAL: 13 %
MCH RBC QN AUTO: 27.5 PG (ref 26.5–33)
MCHC RBC AUTO-ENTMCNC: 32.5 G/DL (ref 31.5–36.5)
MCV RBC AUTO: 84 FL (ref 78–100)
MONOCYTES # BLD MANUAL: 0.7 10E3/UL (ref 0–1.3)
MONOCYTES NFR BLD MANUAL: 8 %
MUCOUS THREADS #/AREA URNS LPF: PRESENT /LPF
NEUTROPHILS # BLD MANUAL: 6.7 10E3/UL (ref 1.6–8.3)
NEUTROPHILS NFR BLD MANUAL: 78 %
NITRATE UR QL: NEGATIVE
PH UR STRIP: 8 [PH] (ref 5–7)
PLAT MORPH BLD: ABNORMAL
PLATELET # BLD AUTO: 110 10E3/UL (ref 150–450)
POTASSIUM SERPL-SCNC: 3.3 MMOL/L (ref 3.4–5.3)
PROT SERPL-MCNC: 5.4 G/DL (ref 6.4–8.3)
RBC # BLD AUTO: 2.95 10E6/UL (ref 3.8–5.2)
RBC MORPH BLD: ABNORMAL
RBC URINE: 1 /HPF
SODIUM SERPL-SCNC: 140 MMOL/L (ref 136–145)
SP GR UR STRIP: 1.01 (ref 1–1.03)
SQUAMOUS EPITHELIAL: 2 /HPF
UROBILINOGEN UR STRIP-MCNC: NORMAL MG/DL
VARIANT LYMPHS BLD QL SMEAR: PRESENT
VIT B12 SERPL-MCNC: 1027 PG/ML (ref 232–1245)
WBC # BLD AUTO: 8.6 10E3/UL (ref 4–11)
WBC URINE: 3 /HPF

## 2023-04-01 PROCEDURE — 36415 COLL VENOUS BLD VENIPUNCTURE: CPT | Performed by: FAMILY MEDICINE

## 2023-04-01 PROCEDURE — 96375 TX/PRO/DX INJ NEW DRUG ADDON: CPT | Mod: 59 | Performed by: FAMILY MEDICINE

## 2023-04-01 PROCEDURE — 74177 CT ABD & PELVIS W/CONTRAST: CPT

## 2023-04-01 PROCEDURE — 250N000011 HC RX IP 250 OP 636: Performed by: FAMILY MEDICINE

## 2023-04-01 PROCEDURE — 81001 URINALYSIS AUTO W/SCOPE: CPT | Performed by: FAMILY MEDICINE

## 2023-04-01 PROCEDURE — 96374 THER/PROPH/DIAG INJ IV PUSH: CPT | Mod: 59 | Performed by: FAMILY MEDICINE

## 2023-04-01 PROCEDURE — 96361 HYDRATE IV INFUSION ADD-ON: CPT | Performed by: FAMILY MEDICINE

## 2023-04-01 PROCEDURE — 99284 EMERGENCY DEPT VISIT MOD MDM: CPT | Mod: 25 | Performed by: FAMILY MEDICINE

## 2023-04-01 PROCEDURE — 250N000009 HC RX 250: Performed by: FAMILY MEDICINE

## 2023-04-01 PROCEDURE — 83690 ASSAY OF LIPASE: CPT | Performed by: FAMILY MEDICINE

## 2023-04-01 PROCEDURE — 82728 ASSAY OF FERRITIN: CPT | Performed by: FAMILY MEDICINE

## 2023-04-01 PROCEDURE — 83605 ASSAY OF LACTIC ACID: CPT | Performed by: FAMILY MEDICINE

## 2023-04-01 PROCEDURE — 85007 BL SMEAR W/DIFF WBC COUNT: CPT | Performed by: FAMILY MEDICINE

## 2023-04-01 PROCEDURE — 82378 CARCINOEMBRYONIC ANTIGEN: CPT | Performed by: FAMILY MEDICINE

## 2023-04-01 PROCEDURE — 85027 COMPLETE CBC AUTOMATED: CPT | Performed by: FAMILY MEDICINE

## 2023-04-01 PROCEDURE — 99285 EMERGENCY DEPT VISIT HI MDM: CPT | Mod: 25 | Performed by: FAMILY MEDICINE

## 2023-04-01 PROCEDURE — 83550 IRON BINDING TEST: CPT | Performed by: FAMILY MEDICINE

## 2023-04-01 PROCEDURE — 80053 COMPREHEN METABOLIC PANEL: CPT | Performed by: FAMILY MEDICINE

## 2023-04-01 PROCEDURE — 76705 ECHO EXAM OF ABDOMEN: CPT | Performed by: FAMILY MEDICINE

## 2023-04-01 PROCEDURE — 76705 ECHO EXAM OF ABDOMEN: CPT | Mod: 26 | Performed by: FAMILY MEDICINE

## 2023-04-01 PROCEDURE — 86304 IMMUNOASSAY TUMOR CA 125: CPT | Performed by: FAMILY MEDICINE

## 2023-04-01 PROCEDURE — 258N000003 HC RX IP 258 OP 636: Performed by: FAMILY MEDICINE

## 2023-04-01 PROCEDURE — 250N000013 HC RX MED GY IP 250 OP 250 PS 637: Performed by: FAMILY MEDICINE

## 2023-04-01 PROCEDURE — 82607 VITAMIN B-12: CPT | Performed by: FAMILY MEDICINE

## 2023-04-01 RX ORDER — IOPAMIDOL 755 MG/ML
53 INJECTION, SOLUTION INTRAVASCULAR ONCE
Status: COMPLETED | OUTPATIENT
Start: 2023-04-01 | End: 2023-04-01

## 2023-04-01 RX ORDER — SODIUM CHLORIDE 9 MG/ML
INJECTION, SOLUTION INTRAVENOUS CONTINUOUS
Status: DISCONTINUED | OUTPATIENT
Start: 2023-04-01 | End: 2023-04-01 | Stop reason: HOSPADM

## 2023-04-01 RX ORDER — POTASSIUM CHLORIDE 1500 MG/1
40 TABLET, EXTENDED RELEASE ORAL ONCE
Status: COMPLETED | OUTPATIENT
Start: 2023-04-01 | End: 2023-04-01

## 2023-04-01 RX ORDER — HYDROMORPHONE HYDROCHLORIDE 1 MG/ML
0.5 INJECTION, SOLUTION INTRAMUSCULAR; INTRAVENOUS; SUBCUTANEOUS
Status: DISCONTINUED | OUTPATIENT
Start: 2023-04-01 | End: 2023-04-01 | Stop reason: HOSPADM

## 2023-04-01 RX ORDER — ONDANSETRON 2 MG/ML
4 INJECTION INTRAMUSCULAR; INTRAVENOUS EVERY 30 MIN PRN
Status: DISCONTINUED | OUTPATIENT
Start: 2023-04-01 | End: 2023-04-01 | Stop reason: HOSPADM

## 2023-04-01 RX ORDER — ONDANSETRON 4 MG/1
4 TABLET, ORALLY DISINTEGRATING ORAL EVERY 8 HOURS PRN
Qty: 10 TABLET | Refills: 0 | Status: SHIPPED | OUTPATIENT
Start: 2023-04-01

## 2023-04-01 RX ORDER — MORPHINE SULFATE 15 MG/1
7.5 TABLET ORAL ONCE
Status: COMPLETED | OUTPATIENT
Start: 2023-04-01 | End: 2023-04-01

## 2023-04-01 RX ORDER — KETOROLAC TROMETHAMINE 15 MG/ML
15 INJECTION, SOLUTION INTRAMUSCULAR; INTRAVENOUS ONCE
Status: COMPLETED | OUTPATIENT
Start: 2023-04-01 | End: 2023-04-01

## 2023-04-01 RX ORDER — MORPHINE SULFATE 15 MG/1
15 TABLET ORAL EVERY 4 HOURS PRN
Qty: 6 TABLET | Refills: 0 | Status: SHIPPED | OUTPATIENT
Start: 2023-04-01

## 2023-04-01 RX ORDER — MORPHINE SULFATE 15 MG/1
7.5 TABLET ORAL ONCE
Status: DISCONTINUED | OUTPATIENT
Start: 2023-04-01 | End: 2023-04-01

## 2023-04-01 RX ADMIN — POTASSIUM CHLORIDE 40 MEQ: 20 TABLET, EXTENDED RELEASE ORAL at 10:54

## 2023-04-01 RX ADMIN — ONDANSETRON 4 MG: 2 INJECTION INTRAMUSCULAR; INTRAVENOUS at 07:37

## 2023-04-01 RX ADMIN — KETOROLAC TROMETHAMINE 15 MG: 15 INJECTION, SOLUTION INTRAMUSCULAR; INTRAVENOUS at 07:38

## 2023-04-01 RX ADMIN — SODIUM CHLORIDE 1000 ML: 9 INJECTION, SOLUTION INTRAVENOUS at 07:37

## 2023-04-01 RX ADMIN — MORPHINE SULFATE 7.5 MG: 15 TABLET ORAL at 11:54

## 2023-04-01 RX ADMIN — IOPAMIDOL 53 ML: 755 INJECTION, SOLUTION INTRAVENOUS at 09:16

## 2023-04-01 RX ADMIN — SODIUM CHLORIDE 55 ML: 9 INJECTION, SOLUTION INTRAVENOUS at 09:16

## 2023-04-01 RX ADMIN — HYDROMORPHONE HYDROCHLORIDE 0.5 MG: 1 INJECTION, SOLUTION INTRAMUSCULAR; INTRAVENOUS; SUBCUTANEOUS at 07:39

## 2023-04-01 ASSESSMENT — ENCOUNTER SYMPTOMS
COUGH: 0
NAUSEA: 1
DIAPHORESIS: 0
FREQUENCY: 0
VOMITING: 0
CONSTIPATION: 0
BLOOD IN STOOL: 0
HEADACHES: 0
WHEEZING: 0
CHILLS: 0
FEVER: 0
SORE THROAT: 0
SHORTNESS OF BREATH: 0
ABDOMINAL DISTENTION: 0
DYSURIA: 0
PALPITATIONS: 0
DIARRHEA: 0
ABDOMINAL PAIN: 1
SINUS PRESSURE: 0

## 2023-04-01 ASSESSMENT — ACTIVITIES OF DAILY LIVING (ADL)
ADLS_ACUITY_SCORE: 35
ADLS_ACUITY_SCORE: 35

## 2023-04-01 NOTE — ED PROVIDER NOTES
History   No chief complaint on file.    HPI  Devika Laird is a 57 year old female who presents withmultiple sclerosis.  Uterine leiomyoma.  Off Copaxone for several years without relapse in [place].  No recent visits in review of care everywhere from [facility] in [facility] where she is seen.  Distant history of tobacco abuse since quit many years ago  Last visits appear to have been at [facility] in 2022 July for gynecologic concerns including her fibroids.  No procedures gynecologically related to her fibroids.    No recent changes in health status and was in her usual state of health until about 430 this morning when she had onset of cramping lower abdominal pain suprapubic left lower quadrant no significant radiation no flank pain no obvious dysuria urgency frequency or hematuria.  No fever.  Nausea without vomiting.  No diarrhea or constipation.  Had a few small stools this morning but did not modify her pain.  Pain is severe on presentation.          Allergies:  Allergies   Allergen Reactions     Penicillin [Penicillins] Itching     Deep itching.        Problem List:    Patient Active Problem List    Diagnosis Date Noted     Health Care Home 06/28/2011     Priority: High     EMERGENCY CARE PLAN  Presenting Problem Signs and Symptoms Treatment Plan    Questions or conerns during clinic hours    I will call the clinic directly     Questions or conerns outside clinic hours    I will call the 24 hour nurse line at [phone]    Patient needs to schedule an appointment    I will call the 24 hour scheduling team at [phone] or clinic directly    Same day treatment     I will call the clinic first, nurse line if after hours, urgent care and express care if needed                            DX V65.8 REPLACED WITH [id number] [facility] CARE HOME (04/08/2013)       Family history of breast cancer in mother 05/04/2014     Priority: Medium     Screening for colorectal cancer 05/04/2014     Priority: Medium     Multiple  sclerosis (H) 05/04/2014     Priority: Medium     Pulmonary nodule 10/15/2013     Priority: Medium     NICOLE nodule and associated lymphadenopathy.   First noted 08/2013  Axillary node bx - negative  EBUS bx - negative       History of tobacco use 10/15/2013     Priority: Medium     CARDIOVASCULAR SCREENING; LDL GOAL LESS THAN 160 10/31/2010     Priority: Medium     Uterine leiomyoma 12/07/2009     Priority: Medium     (Problem list name updated by automated process. Provider to review and confirm.)       MS (multiple sclerosis) (H) 12/04/2009     Priority: Medium     Mostly as vertigo.  On copaxon.  Well controlled over summer. Never hospitalized.        Other nonspecific abnormal result of function study of brain and central nervous system 11/09/2006     Priority: Medium     Anxiety state 05/03/2006     Priority: Medium     No meds at this time Lora Rivera MD  Controlled.      Problem list name updated by automated process. Provider to review          Past Medical History:    Past Medical History:   Diagnosis Date     Basal cell carcinoma      Mediastinal adenopathy      Multiple scleroses      Sprain of neck        Past Surgical History:    Past Surgical History:   Procedure Laterality Date     D & C       ENDOBRONCHIAL ULTRASOUND FLEXIBLE  9/16/2013    Procedure: ENDOBRONCHIAL ULTRASOUND FLEXIBLE;  WITH FNA;  Surgeon: Nathan Bucio MD;  Location:  GI     SURGICAL HISTORY OF -       cyst on jawline       Family History:    Family History   Problem Relation Age of Onset     Breast Cancer Mother         53 year when diagnosed     Gynecology Mother      Thyroid Disease Mother      Heart Disease Mother         valve issue/takes no med     Lipids Mother      Diabetes Maternal Grandmother      Heart Disease Maternal Grandmother      Cerebrovascular Disease Maternal Grandmother      Arthritis Maternal Grandmother      Hypertension Father      Musculoskeletal Disorder Father      Gastrointestinal Disease  Paternal Grandmother      Arthritis Paternal Grandmother      Gastrointestinal Disease Paternal Grandfather      Cancer Paternal Grandfather         skin cancer     Hypertension Maternal Grandfather      Allergies Brother      Breast Cancer Maternal Aunt        Social History:  Marital Status:   [2]  Social History     Tobacco Use     Smoking status: Former     Packs/day: 0.10     Years: 20.00     Pack years: 2.00     Types: Cigarettes     Quit date: 8/10/2013     Years since quittin.6     Smokeless tobacco: Never     Tobacco comments:     2-3 cigs per day off and on 3/2013   Substance Use Topics     Alcohol use: No     Alcohol/week: 1.7 standard drinks     Types: 2 Standard drinks or equivalent per week     Drug use: No        Medications:    aspirin 81 MG tablet  Cholecalciferol (VITAMIN D) 2000 UNIT tablet  COPAXONE 20 MG/ML injection  cyanocolbalamin (VITAMIN  B-12) 1000 MCG tablet  IBUPROFEN PO  magnesium 100 MG CAPS  Zinc 100 MG TABS          Review of Systems   Constitutional: Negative for chills, diaphoresis and fever.   HENT: Negative for ear pain, sinus pressure and sore throat.    Eyes: Negative for visual disturbance.   Respiratory: Negative for cough, shortness of breath and wheezing.    Cardiovascular: Negative for chest pain and palpitations.   Gastrointestinal: Positive for abdominal pain and nausea. Negative for abdominal distention, blood in stool, constipation, diarrhea and vomiting.   Genitourinary: Negative for dysuria, frequency and urgency.   Skin: Negative for rash.   Neurological: Negative for headaches.   All other systems reviewed and are negative.      Physical Exam          Physical Exam  HENT:      Head: Atraumatic.   Eyes:      Conjunctiva/sclera: Conjunctivae normal.   Cardiovascular:      Rate and Rhythm: Normal rate and regular rhythm.      Heart sounds: No murmur heard.  Pulmonary:      Effort: Pulmonary effort is normal. No respiratory distress.      Breath sounds: No  stridor. No wheezing or rhonchi.   Abdominal:      General: There is no distension.      Palpations: There is no mass.      Tenderness: There is abdominal tenderness. There is no guarding.   Musculoskeletal:      Cervical back: Neck supple.      Right lower leg: No edema.      Left lower leg: No edema.   Skin:     Coloration: Skin is not pale.      Findings: No rash.   Neurological:      General: No focal deficit present.      Mental Status: She is alert.      Motor: No weakness.         ED Course                 Procedures              Critical Care time:  none               Results for orders placed or performed during the hospital encounter of 04/01/23 (from the past 24 hour(s))   CBC with platelets differential    Narrative    The following orders were created for panel order CBC with platelets differential.  Procedure                               Abnormality         Status                     ---------                               -----------         ------                     CBC with platelets and d...[[id number]]  Abnormal            Final result               Manual Differential[[id number]]          Abnormal            Final result                 Please view results for these tests on the individual orders.   Comprehensive metabolic panel   Result Value Ref Range    Sodium 140 136 - 145 mmol/L    Potassium 3.3 (L) 3.4 - 5.3 mmol/L    Chloride 103 98 - 107 mmol/L    Carbon Dioxide (CO2) 23 22 - 29 mmol/L    Anion Gap 14 7 - 15 mmol/L    Urea Nitrogen 9.0 8.0 - 23.0 mg/dL    Creatinine 0.60 0.51 - 0.95 mg/dL    Calcium 8.3 (L) 8.6 - 10.0 mg/dL    Glucose 128 (H) 70 - 99 mg/dL    Alkaline Phosphatase 146 (H) 35 - 104 U/L    AST 37 (H) 10 - 35 U/L    ALT 18 10 - 35 U/L    Protein Total 5.4 (L) 6.4 - 8.3 g/dL    Albumin 3.1 (L) 3.5 - 5.2 g/dL    Bilirubin Total 0.6 <=1.2 mg/dL    GFR Estimate >90 >60 mL/min/1.73m2   Lipase   Result Value Ref Range    Lipase 33 13 - 60 U/L   Lactic acid whole blood   Result  Value Ref Range    Lactic Acid 1.2 0.7 - 2.0 mmol/L   CBC with platelets and differential   Result Value Ref Range    WBC Count 8.6 4.0 - 11.0 10e3/uL    RBC Count 2.95 (L) 3.80 - 5.20 10e6/uL    Hemoglobin 8.1 (L) 11.7 - 15.7 g/dL    Hematocrit 24.9 (L) 35.0 - 47.0 %    MCV 84 78 - 100 fL    MCH 27.5 26.5 - 33.0 pg    MCHC 32.5 31.5 - 36.5 g/dL    RDW 17.9 (H) 10.0 - 15.0 %    Platelet Count 110 (L) 150 - 450 10e3/uL   POC US ABDOMEN LIMITED    [facility] Procedure Note      Limited Bedside ED Aorta Ultrasound:    PROCEDURE: PERFORMED BY: Dr. Yan Triana MD  INDICATIONS:  Abdominal Pain    PROBE:  Low frequency convex probe  BODY LOCATION: Abdomen  FINDINGS:  The ultrasound was performed using longitudinal and transverse views.   Normal: Abdominal aorta < 4 cm.  MEASUREMENT:  Not dilated   No evidence of free fluid in hepatorenal (Morison's pouch), perisplenic, or and pelvic areas. No evidence of pericardial effusion.  INTERPRETATION:  The evaluation of the aorta was of normal caliber (ie < 4cm in the transverse/longitudinal views) without evidence of aneurysm or dilation.  Aorta visualized in entirety from insertion into the intra-abdominal cavity to bifurcation into iliac vessels. There was no abdominal free fluid.  I also see no hydronephrosis.  The gallbladder showed no obvious shadowing.  IMAGE DOCUMENTATION: Images were archived to PACs system.     Manual Differential   Result Value Ref Range    % Neutrophils 78 %    % Lymphocytes 13 %    % Monocytes 8 %    % Eosinophils 0 %    % Basophils 1 %    Absolute Neutrophils 6.7 1.6 - 8.3 10e3/uL    Absolute Lymphocytes 1.1 0.8 - 5.3 10e3/uL    Absolute Monocytes 0.7 0.0 - 1.3 10e3/uL    Absolute Eosinophils 0.0 0.0 - 0.7 10e3/uL    Absolute Basophils 0.1 0.0 - 0.2 10e3/uL    RBC Morphology Confirmed RBC Indices     Platelet Assessment  Automated Count Confirmed. Platelet morphology is normal.     Automated Count Confirmed. Platelet  morphology is normal.    Reactive Lymphocytes Present (A) None Seen   UA with Microscopic reflex to Culture    Specimen: Urine, Clean Catch   Result Value Ref Range    Color Urine Yellow Colorless, Straw, Light Yellow, Yellow    Appearance Urine Slightly Cloudy (A) Clear    Glucose Urine Negative Negative mg/dL    Bilirubin Urine Negative Negative    Ketones Urine 5 (A) Negative mg/dL    Specific Gravity Urine 1.009 1.003 - 1.035    Blood Urine Negative Negative    pH Urine 8.0 (H) 5.0 - 7.0    Protein Albumin Urine Negative Negative mg/dL    Urobilinogen Urine Normal Normal, 2.0 mg/dL    Nitrite Urine Negative Negative    Leukocyte Esterase Urine Trace (A) Negative    Mucus Urine Present (A) None Seen /LPF    RBC Urine 1 <=2 /HPF    WBC Urine 3 <=5 /HPF    Squamous Epithelials Urine 2 (H) <=1 /HPF    Narrative    Urine Culture not indicated   CT Abdomen Pelvis w Contrast    Narrative    EXAM: CT ABDOMEN AND PELVIS WITH CONTRAST  LOCATION: [facility]  DATE/TIME: 04/01/2023, 9:27 AM    INDICATION: Severe suprapubic and left lower quadrant abdominal pain. Evaluate for diverticulitis, AAA, stone.  COMPARISON: None.  TECHNIQUE: CT scan of the abdomen and pelvis was performed following injection of IV contrast. Multiplanar reformats were obtained. Dose reduction techniques were used.  CONTRAST: 53 mL Isovue 370.    FINDINGS:   LOWER CHEST: Cluster of nodules measuring up to 9 mm noted in the periphery of the left lower lobe. 1.2 cm nodule periphery of the right lower lobe. No effusions.    HEPATOBILIARY: No significant mass or bile duct dilatation. No calcified gallstones.     PANCREAS: No significant mass, duct dilatation, or inflammatory change.    SPLEEN: Enlarged spleen with multiple masses present measuring up to 5.3 cm superomedially.    ADRENAL GLANDS: No significant nodules.    KIDNEYS/BLADDER: No significant mass, stone, or hydronephrosis.    BOWEL: No obstruction or inflammatory  change.    LYMPH NODES: Probable adenopathy at the splenic hilum with a lymph node measuring 2.1 cm. Additional splenic hilum adenopathy measuring 2.6 cm versus a lesion arising from the gastric fundus.    VASCULATURE: No abdominal aortic aneurysm.    PELVIC ORGANS: 13.2 x 9.0 x 6.8 cm mass that appears to arise from the left ovary/adnexa. Heterogeneously enhancing mass in the pelvis likely arising from the uterus measuring 13.7 x 11.5 x 12.2 cm. Additional smaller probable fibroids. Cannot exclude a   mass arising from the right ovary/adnexa as a normal ovary/adnexa is not demonstrated. Small amount of ascites.    MUSCULOSKELETAL: No frankly destructive bony lesions.      Impression    IMPRESSION:   1.  Multiple large pelvic masses concerning for gynecologic malignancy.  2.  Multiple splenic masses concerning for metastatic disease.  3.  Suspected adenopathy in the splenic hilum concerning for metastatic disease.  4.  Small amount of ascites.  5.  Pulmonary nodules at the lung bases concerning for metastatic disease.         Medications   HYDROmorphone (PF) (DILAUDID) injection 0.5 mg (has no administration in time range)   ketorolac (TORADOL) injection 15 mg (has no administration in time range)   0.9% sodium chloride BOLUS (has no administration in time range)     Followed by   sodium chloride 0.9% infusion (has no administration in time range)   ondansetron (ZOFRAN) injection 4 mg (has no administration in time range)       Assessments & Plan (with Medical Decision Making)     MDM: Devika Laird is a 57 year old female who presents with sudden onset suprapubic abdominal pain and left lower quadrant abdominal pain.  Known uterine fibroids but no significant bowel disorders and no known AAA but does have a tobacco history.    Differential includes ureteral stones, diverticulitis, AAA.  We will perform bedside ultrasound analgesics antiemetics IV fluids.  Broad lab testing    bedside ultrasound reassuring.  No  signs of AAA free fluid or hydronephrosis.    The imaging demonstrates a very large intra-abdominal mass.  In excess of 14 x 11 x 12 cm.  This takes up much of the pelvis is likely responsible for her pain.  There is no obvious active bleeding although she is anemic.  She has several lesions consistent with metastatic disease.  This is all new.  She has been seen at [facility] previously for her gynecologic care and uterine fibroids.  She would like to continue to see [facility].  I did originally speak with gynecologic oncology at [facility] and they are aware of the patient I placed a consult for them.  They did review imaging today and I asked regarding the mass and related iliacs that appeared almost be involved by the mass.  Especially on the right side.  However apparently these pelvic masses tend not to invade vascular structures.  They did recommend tumor markers and I placed these orders.    Also placed a consultation for primary care to see her in follow-up as she will need pain management early this coming week.  I also called to [facility] to try to arrange for her to be seen by one of the providers in a more rapid fashion as she has been seen by them previously as recently as this past summer but was unable to arrange that visit but we were given the phone number for her to call them.  I have also pushed her CT imaging to [facility] system.    We discussed that she will need a repeat hemoglobin this week in clinic as she is now down to 8.1 unclear if this is recent.  Her MCV is 84 and could be either microcytic macrocytic or anemia of chronic disease.  Doubt acute blood loss based on CT imaging at least at this point.    She was mildly hypokalemic and this was replaced here before discharge      I have reviewed the nursing notes.    I have reviewed the findings, diagnosis, plan and need for follow up with the patient.           Medical Decision Making  The patient's presentation was of moderate  complexity (an undiagnosed new problem with uncertain diagnosis).    The patient's evaluation involved:  ordering and/or review of 3+ test(s) in this encounter (see separate area of note for details)  discussion of management or test interpretation with another health professional (gyn onc)    The patient's management necessitated moderate risk (prescription drug management including medications given in the ED) and high risk (a parenteral controlled substance).        New Prescriptions    No medications on file       Final diagnoses:   Pelvic mass - follow-up with gyn-onc at [place] or at [facility].  I have consult for here and did speak with gyn onc at [facility].  markers are pending.  take tylenol 650 mg every 6 hours scheduled.  take ibuprofen 400 mg every 6 hours scheduled with food or milk.  take morphine 7.5 mg (1/2 tab) as needed for breaklthrough pain. take zofran as needed for nausea, vomiting,  take miralax 1/2 to 1 capful daily in 8 oz fluid as needed for constipation relatedd to opioids.   Anemia, unspecified type       4/1/2023   [facility] EMERGENCY DEPT     Yan Triana MD  04/01/23 3200

## 2023-04-01 NOTE — ED TRIAGE NOTES
Pt woke up with abdominal cramping states she feels like she is in labor. Normal small BM this am.      Triage Assessment     Row Name 04/01/23 0723       Triage Assessment (Adult)    Airway WDL WDL       Respiratory WDL    Respiratory WDL WDL       Cardiac WDL    Cardiac WDL WDL       Cognitive/Neuro/Behavioral WDL    Cognitive/Neuro/Behavioral WDL WDL

## 2023-04-01 NOTE — DISCHARGE INSTRUCTIONS
ICD-10-CM    1. Pelvic mass  R19.00 Adult Oncology/Hematology  Referral    follow-up with gyn-onc at [place] or at [facility].  I have consult for here and did speak with gyn onc at [facility].  markers are pending.  take tylenol 650 mg every 6 hours scheduled.  take ibuprofen 400 mg every 6 hours scheduled with food or milk.  take morphine 7.5 mg (1/2 tab) as needed for breaklthrough pain. take zofran as needed for nausea, vomiting,  take miralax 1/2 to 1 capful daily in 8 oz fluid as needed for constipation relatedd to opioids.

## 2023-04-02 ENCOUNTER — TELEPHONE (OUTPATIENT)
Dept: EMERGENCY MEDICINE | Facility: CLINIC | Age: 58
End: 2023-04-02
Payer: COMMERCIAL

## 2023-04-02 NOTE — TELEPHONE ENCOUNTER
[facility] Emergency Department/Urgent Care Lab result notification:    Reason for call    Notify the patient/parent of lab results    Assess patient symptoms    Review ED providers recommendations/discharge instructions (if necessary)    Advise per [facility] ED lab result protocol    Lab result  Final result  Resulted after [facility] Emergency Dept visit on this date 4/1/23.  RN to notify patient of result and advise to relay result to their PCP immediately.    SEE RESULTS BELOW    11:45AM: Left voicemail message requesting a call back to [phone] between 9 a.m. and 5:30 p.m. for patient's ED/UC lab results.      Jemma Casanova RN  [facility] Result RN  [facility] Emergency Dept Lab Result RN  Ph# [phone]    Labs:   Iron and iron binding capacity  Order: [id number]   Status: Final result      Visible to patient: Yes (not seen)      1 Result Note          Component Ref Range & Units 1 d ago    Iron 37 - 145 ug/dL 29 Low      Iron Binding Capacity 240 - 430 ug/dL 313     Iron Sat Index 15 - 46 % 9 Low     Resulting Agency  [facility]              Specimen Collected: 04/01/23  7:27 AM Last Resulted: 04/01/23  2:25 PM              Contains abnormal data Ferritin  Order: [id number]   Status: Final result      Visible to patient: Yes (not seen)      1 Result Note          Component Ref Range & Units 1 d ago    Ferritin 11 - 328 ng/mL 2,343 High     Resulting Agency  [facility]              Specimen Collected: 04/01/23  7:27 AM Last Resulted: 04/01/23  3:10 PM             Order: [id number]   Status: Final result      Visible to patient: Yes (not seen)      1 Result Note          Component Ref Range & Units 1 d ago     <=38 U/mL 66 High     Resulting Agency  [facility]              Narrative  Performed by: JORDAN  This result is obtained using the Roche Elecsys  II method on the bere e801 immunoassay analyzer. Results obtained with different assay  methods or kits cannot be used interchangeably.      Specimen Collected: 04/01/23  7:27 AM Last Resulted: 04/01/23  8:22 PM             Vitamin B12  Order: [id number]   Status: Final result      Visible to patient: Yes (not seen)      1 Result Note            Component Ref Range & Units 1 d ago 10 yr ago 17 yr ago    Vitamin B12 232 - 1,245 pg/mL 1,027  313 R, CM  310 R, CM    Resulting Agency  [facility]              Specimen Collected: 04/01/23  7:27 AM Last Resulted: 04/01/23  7:18 PM               CEA  Order: [id number]   Status: Final result      Visible to patient: Yes (not seen)      1 Result Note          Component Ref Range & Units 1 d ago    CEA ng/mL 1.9    Comment: Nonsmoker (past/never) <=5.0 ng/mL   Current smoker <=6.5 ng/mL     This result is obtained using the Roche Elecsys CEA method on the bere e801 immunoassay analyzer. Results obtained with different assay methods or kits cannot be used interchangeably.   Resulting Agency  [facility]              Specimen Collected: 04/01/23  7:27 AM Last Resulted: 04/01/23  7:08 PM

## 2023-04-02 NOTE — TELEPHONE ENCOUNTER
"RN Assessment (Patient s current Symptoms), include time called.  [Insert Left message here if message left]  11:52AM: Patient states she is \"sore\" in the pelvic region. Feels fine when laying on her back, but feels \"full\" in the abdomen when up walking.     RN Recommendations/Instructions per [place] ED lab result protocol  Patient notified of lab results.  RN reviewed information about the results.   The patient would like her results faxed to her [facility] provider, Roger Stafford M.D.  / Department of Obstetrics and Gynecology in [place], [place]  Fax [phone]  The patient will call the provider tomorrow morning (today is Sunday) to discuss the results.   Advised to return to ED with any worsening symptoms.  The patient is comfortable with the information given and has no further questions.     Please Contact your PCP clinic or return to the Emergency department if your:    Symptoms return.    Symptoms worsen or other concerning symptom's.    PCP follow-up Questions asked: YES       Jemma Casanova RN  [facility]  Emergency Dept Lab Result RN  Ph# [phone]     Copy of Lab result   See below note      "

## 2023-04-03 ENCOUNTER — PATIENT OUTREACH (OUTPATIENT)
Dept: ONCOLOGY | Facility: CLINIC | Age: 58
End: 2023-04-03
Payer: COMMERCIAL

## 2023-04-03 NOTE — PROGRESS NOTES
"New Patient Hematology / Oncology Nurse Navigator Note     Referral Date: 4/2/23    Referring provider:   Yan Triana MD   [address] [place] [address]   Phone: [phone]   Fax: [phone]       Referring Clinic/Organization: [facility]     Referred to: GynOnc    Requested provider (if applicable): First available - did not specify     Evaluation for : Pelvic Mass. Per result notes:  \"Patient notified of lab results.    Results faxed to [facility] provider, Roger Stafford M.D.  / Department of Obstetrics and Gynecology in [place], [place]  Fax [phone]  The patient will call the provider to discuss the results.  See telephone encounter. \"     Clinical History (per Nurse review of records provided):      5/7/15 Office Visit with Dr. Dodson  -- BOOKMARKED    4/1/23 CT Abd/Pelvis:  IMPRESSION:   1.  Multiple large pelvic masses concerning for gynecologic malignancy.  2.  Multiple splenic masses concerning for metastatic disease.  3.  Suspected adenopathy in the splenic hilum concerning for metastatic disease.  4.  Small amount of ascites.  5.  Pulmonary nodules at the lung bases concerning for metastatic disease. -- BOOKMARKED    4/1 labs:   Component Ref Range & Units 2 d ago     <=38 U/mL 66 High          -- BOOKMARKED    Clinical Assessment / Barriers to Care (Per Nurse):  Pt lives in [facility]      Referral updates and Plan:   Per chart notes, pt will follow-up with GynOnc team at [facility]. Pt had appt with GynOnc today at [facility] (not not yet viewable in [facility]). Will reject referral as pt is already established/has followed-up with [facility].     Jkai Basurto, BSN, RN, PHN, OCN  Hematology/Oncology Nurse Navigator  [facility]  [phone]      "

## 2023-04-17 ENCOUNTER — HOSPITAL ENCOUNTER (EMERGENCY)
Facility: CLINIC | Age: 58
Discharge: HOME | End: 2023-04-17
Attending: FAMILY MEDICINE | Admitting: FAMILY MEDICINE
Payer: COMMERCIAL

## 2023-04-17 VITALS
OXYGEN SATURATION: 96 % | HEART RATE: 80 BPM | RESPIRATION RATE: 18 BRPM | DIASTOLIC BLOOD PRESSURE: 80 MMHG | SYSTOLIC BLOOD PRESSURE: 141 MMHG | TEMPERATURE: 98.5 F

## 2023-04-17 DIAGNOSIS — N30.00 ACUTE CYSTITIS WITHOUT HEMATURIA: ICD-10-CM

## 2023-04-17 LAB
ALBUMIN SERPL BCG-MCNC: 2.6 G/DL (ref 3.5–5.2)
ALBUMIN UR-MCNC: 30 MG/DL
ALP SERPL-CCNC: 230 U/L (ref 35–104)
ALT SERPL W P-5'-P-CCNC: 10 U/L (ref 10–35)
ANION GAP SERPL CALCULATED.3IONS-SCNC: 14 MMOL/L (ref 7–15)
APPEARANCE UR: ABNORMAL
AST SERPL W P-5'-P-CCNC: 17 U/L (ref 10–35)
BACTERIA #/AREA URNS HPF: ABNORMAL /HPF
BASOPHILS # BLD MANUAL: 0.2 10E3/UL (ref 0–0.2)
BASOPHILS NFR BLD MANUAL: 1 %
BILIRUB SERPL-MCNC: 0.2 MG/DL
BILIRUB UR QL STRIP: NEGATIVE
BUN SERPL-MCNC: 4.8 MG/DL (ref 8–23)
CALCIUM SERPL-MCNC: 8.2 MG/DL (ref 8.6–10)
CHLORIDE SERPL-SCNC: 104 MMOL/L (ref 98–107)
COLOR UR AUTO: YELLOW
CREAT SERPL-MCNC: 0.42 MG/DL (ref 0.51–0.95)
DEPRECATED HCO3 PLAS-SCNC: 23 MMOL/L (ref 22–29)
EOSINOPHIL # BLD MANUAL: 0 10E3/UL (ref 0–0.7)
EOSINOPHIL NFR BLD MANUAL: 0 %
ERYTHROCYTE [DISTWIDTH] IN BLOOD BY AUTOMATED COUNT: 16.2 % (ref 10–15)
GFR SERPL CREATININE-BSD FRML MDRD: >90 ML/MIN/1.73M2
GLUCOSE SERPL-MCNC: 124 MG/DL (ref 70–99)
GLUCOSE UR STRIP-MCNC: NEGATIVE MG/DL
HCT VFR BLD AUTO: 29 % (ref 35–47)
HGB BLD-MCNC: 9 G/DL (ref 11.7–15.7)
HGB UR QL STRIP: ABNORMAL
KETONES UR STRIP-MCNC: NEGATIVE MG/DL
LEUKOCYTE ESTERASE UR QL STRIP: ABNORMAL
LIPASE SERPL-CCNC: 55 U/L (ref 13–60)
LYMPHOCYTES # BLD MANUAL: 2 10E3/UL (ref 0.8–5.3)
LYMPHOCYTES NFR BLD MANUAL: 8 %
MCH RBC QN AUTO: 27.6 PG (ref 26.5–33)
MCHC RBC AUTO-ENTMCNC: 31 G/DL (ref 31.5–36.5)
MCV RBC AUTO: 89 FL (ref 78–100)
MONOCYTES # BLD MANUAL: 4.4 10E3/UL (ref 0–1.3)
MONOCYTES NFR BLD MANUAL: 18 %
NEUTROPHILS # BLD MANUAL: 17.8 10E3/UL (ref 1.6–8.3)
NEUTROPHILS NFR BLD MANUAL: 73 %
NITRATE UR QL: POSITIVE
PH UR STRIP: 7 [PH] (ref 5–7)
PLAT MORPH BLD: ABNORMAL
PLATELET # BLD AUTO: 1180 10E3/UL (ref 150–450)
POTASSIUM SERPL-SCNC: 3.6 MMOL/L (ref 3.4–5.3)
PROT SERPL-MCNC: 5.3 G/DL (ref 6.4–8.3)
RBC # BLD AUTO: 3.26 10E6/UL (ref 3.8–5.2)
RBC MORPH BLD: ABNORMAL
RBC URINE: 18 /HPF
SODIUM SERPL-SCNC: 141 MMOL/L (ref 136–145)
SP GR UR STRIP: 1 (ref 1–1.03)
UROBILINOGEN UR STRIP-MCNC: NORMAL MG/DL
VARIANT LYMPHS BLD QL SMEAR: PRESENT
WBC # BLD AUTO: 24.4 10E3/UL (ref 4–11)
WBC CLUMPS #/AREA URNS HPF: PRESENT /HPF
WBC URINE: >182 /HPF

## 2023-04-17 PROCEDURE — 80053 COMPREHEN METABOLIC PANEL: CPT | Performed by: FAMILY MEDICINE

## 2023-04-17 PROCEDURE — 85027 COMPLETE CBC AUTOMATED: CPT | Performed by: FAMILY MEDICINE

## 2023-04-17 PROCEDURE — 99284 EMERGENCY DEPT VISIT MOD MDM: CPT | Performed by: FAMILY MEDICINE

## 2023-04-17 PROCEDURE — 83690 ASSAY OF LIPASE: CPT | Performed by: FAMILY MEDICINE

## 2023-04-17 PROCEDURE — 87088 URINE BACTERIA CULTURE: CPT | Performed by: FAMILY MEDICINE

## 2023-04-17 PROCEDURE — 85007 BL SMEAR W/DIFF WBC COUNT: CPT | Performed by: FAMILY MEDICINE

## 2023-04-17 PROCEDURE — 81001 URINALYSIS AUTO W/SCOPE: CPT | Performed by: FAMILY MEDICINE

## 2023-04-17 PROCEDURE — 250N000013 HC RX MED GY IP 250 OP 250 PS 637: Performed by: FAMILY MEDICINE

## 2023-04-17 PROCEDURE — 36415 COLL VENOUS BLD VENIPUNCTURE: CPT | Performed by: FAMILY MEDICINE

## 2023-04-17 RX ORDER — SULFAMETHOXAZOLE/TRIMETHOPRIM 800-160 MG
1 TABLET ORAL 2 TIMES DAILY
Qty: 14 TABLET | Refills: 0 | Status: SHIPPED | OUTPATIENT
Start: 2023-04-17 | End: 2023-04-24

## 2023-04-17 RX ORDER — SULFAMETHOXAZOLE/TRIMETHOPRIM 800-160 MG
160 TABLET ORAL ONCE
Status: COMPLETED | OUTPATIENT
Start: 2023-04-17 | End: 2023-04-17

## 2023-04-17 RX ADMIN — SULFAMETHOXAZOLE AND TRIMETHOPRIM 160 MG: 800; 160 TABLET ORAL at 07:30

## 2023-04-17 ASSESSMENT — ACTIVITIES OF DAILY LIVING (ADL): ADLS_ACUITY_SCORE: 35

## 2023-04-17 NOTE — ED PROVIDER NOTES
"  HPI   Patient is a 57-year-old female presenting with increasing pelvic pain.  She was admitted on 4/5 at the [facility] in [place], [place] for planned abdominal surgery in regards to an ovarian mass.  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  She had a prolonged course in the hospital because of increased fluid drainage and concern for infection.  This was short-lived and antibiotics were discontinued and the drain pulled as the fluid volume was decreasing.  She was discharged with a Wilson catheter in place and a ureteral stent in place.  Other medical problems include MS for which she takes Copaxone.  Former smoker, quitting in 2013.  No drugs of abuse.  No alcohol.  She is here with her  by private car.    The patient reports abdominal and pelvic discomfort that have been constant since discharge, but increasing midline low pelvic pain since 2 days ago.  The pain is constant.  She says, \"the Wilson seems to be bothering me a lot too.\"  She reports the Wilson is draining well.  No fever.  No nausea or vomiting.  Report of \"normalizing bowels.\"  No trauma or injury.        Allergies:  Allergies   Allergen Reactions     Penicillin [Penicillins] Itching     Deep itching.      Problem List:    Patient Active Problem List    Diagnosis Date Noted     Health Care Home 06/28/2011     Priority: High     EMERGENCY CARE PLAN  Presenting Problem Signs and Symptoms Treatment Plan    Questions or conerns during clinic hours    I will call the clinic directly     Questions or conerns outside clinic hours    I will call the 24 hour nurse line at [phone]    Patient needs to schedule an appointment    I will call the 24 hour scheduling team at [phone] or clinic directly    Same day treatment     I will call the clinic first, nurse line if after hours, urgent care and express care if needed                      "       DX V65.8 REPLACED WITH [id number] HEALTH CARE HOME (04/08/2013)       Family history of breast cancer in mother 05/04/2014     Priority: Medium     Screening for colorectal cancer 05/04/2014     Priority: Medium     Multiple sclerosis (H) 05/04/2014     Priority: Medium     Pulmonary nodule 10/15/2013     Priority: Medium     NICOLE nodule and associated lymphadenopathy.   First noted 08/2013  Axillary node bx - negative  EBUS bx - negative       History of tobacco use 10/15/2013     Priority: Medium     CARDIOVASCULAR SCREENING; LDL GOAL LESS THAN 160 10/31/2010     Priority: Medium     Uterine leiomyoma 12/07/2009     Priority: Medium     (Problem list name updated by automated process. Provider to review and confirm.)       MS (multiple sclerosis) (H) 12/04/2009     Priority: Medium     Mostly as vertigo.  On copaxon.  Well controlled over summer. Never hospitalized.        Other nonspecific abnormal result of function study of brain and central nervous system 11/09/2006     Priority: Medium     Anxiety state 05/03/2006     Priority: Medium     No meds at this time Lora Rivera MD  Controlled.      Problem list name updated by automated process. Provider to review        Past Medical History:    Past Medical History:   Diagnosis Date     Basal cell carcinoma      Mediastinal adenopathy      Multiple scleroses      Sprain of neck      Past Surgical History:    Past Surgical History:   Procedure Laterality Date     D & C       ENDOBRONCHIAL ULTRASOUND FLEXIBLE  9/16/2013    Procedure: ENDOBRONCHIAL ULTRASOUND FLEXIBLE;  WITH FNA;  Surgeon: Nathan Bucio MD;  Location:  GI     SURGICAL HISTORY OF -       cyst on jawline     Family History:    Family History   Problem Relation Age of Onset     Breast Cancer Mother         53 year when diagnosed     Gynecology Mother      Thyroid Disease Mother      Heart Disease Mother         valve issue/takes no med     Lipids Mother      Diabetes Maternal Grandmother       Heart Disease Maternal Grandmother      Cerebrovascular Disease Maternal Grandmother      Arthritis Maternal Grandmother      Hypertension Father      Musculoskeletal Disorder Father      Gastrointestinal Disease Paternal Grandmother      Arthritis Paternal Grandmother      Gastrointestinal Disease Paternal Grandfather      Cancer Paternal Grandfather         skin cancer     Hypertension Maternal Grandfather      Allergies Brother      Breast Cancer Maternal Aunt      Social History:  Marital Status:   [2]  Social History     Tobacco Use     Smoking status: Former     Packs/day: 0.10     Years: 20.00     Pack years: 2.00     Types: Cigarettes     Quit date: 8/10/2013     Years since quittin.6     Smokeless tobacco: Never     Tobacco comments:     2-3 cigs per day off and on 3/2013   Substance Use Topics     Alcohol use: No     Alcohol/week: 1.7 standard drinks of alcohol     Types: 2 Standard drinks or equivalent per week     Drug use: No      Medications:    sulfamethoxazole-trimethoprim (BACTRIM DS) 800-160 MG tablet  aspirin 81 MG tablet  Cholecalciferol (VITAMIN D) 2000 UNIT tablet  COPAXONE 20 MG/ML injection  cyanocolbalamin (VITAMIN  B-12) 1000 MCG tablet  IBUPROFEN PO  magnesium 100 MG CAPS  morphine (MSIR) 15 MG IR tablet  ondansetron (ZOFRAN ODT) 4 MG ODT tab  Zinc 100 MG TABS      Review of Systems   All other systems reviewed and are negative.      PE   BP: 136/81  Pulse: 96  Temp: 97.8  F (36.6  C)  Resp: 18  SpO2: 96 %  Physical Exam  Vitals reviewed.   Constitutional:       General: She is not in acute distress.     Appearance: She is well-developed.      Comments: Comfortable appearing.  Cooperative and answering questions well.   HENT:      Head: Normocephalic and atraumatic.      Right Ear: External ear normal.      Left Ear: External ear normal.      Nose: Nose normal.      Mouth/Throat:      Mouth: Mucous membranes are moist.      Pharynx: Oropharynx is clear.   Eyes:       Extraocular Movements: Extraocular movements intact.      Conjunctiva/sclera: Conjunctivae normal.      Pupils: Pupils are equal, round, and reactive to light.   Cardiovascular:      Rate and Rhythm: Normal rate and regular rhythm.   Pulmonary:      Effort: Pulmonary effort is normal.   Abdominal:      Comments: Midline incision appears well-healing.  No redness or drainage.  She has moderate tenderness as I push firmly in the midline pelvis.  No obvious distention.   Musculoskeletal:         General: Normal range of motion.      Cervical back: Normal range of motion.   Skin:     General: Skin is warm and dry.   Neurological:      Mental Status: She is alert and oriented to person, place, and time.   Psychiatric:         Behavior: Behavior normal.         ED COURSE and [facility]   0611.  Patient has symptoms and signs as described above.  CBC, comprehensive panel, lipase pending.  Urine analysis pending.    0721.  Patient with an obviously abnormal urine analysis.  She has significant leukocytosis and thrombocytosis in conjunction.  I am cautious with her presentation and I am certainly considering potential complications related to her recent surgery.  However, patient has focused low midline pelvic pain and tenderness.  She looks excellent in the room and is cooperative and smiling and conversational.  She does not have hypotension or tachycardia.  She does not have a fever.  I suspect she is with acute cystitis only and this related to her indwelling catheter.  We talked at length about worsening symptoms and when to return to the ED.  We talked about when to expect improvement and when to reach out to her clinic or surgeon for follow-up.  The patient is requesting Bactrim and I think this is reasonable, again given the likelihood that this is localized cystitis.  She has not currently immunosuppressed.  The patient agrees with the plan and would like to be discharged home.  Her  is present throughout and agrees  as well.    Electronic medical chart reviewed, including medical problems, medications, medical allergies, social history.  Recent hospitalizations and surgical procedures reviewed.  Recent clinic visits and consultations reviewed.  Recent labs and test results reviewed.  Nursing notes reviewed.    0723.  The patient, their parent if applicable, and/or their medical decision maker(s) and I have reviewed all of the available historical information, applicable PMH, physical exam findings, and objective diagnostic data gathered during this ED visit.  We then discussed all work-up options and then together agreed upon the course taken during this visit.  The ultimate disposition and plan was a cooperative decision made between myself and the patient, their parent if applicable, and/or their legal decision maker(s).  The risks and benefits of all decisions made during this visit were discussed to the best of my abilities given the circumstances, and all parties are understanding of the pertinent ramifications of these decisions.      LABS  Labs Ordered and Resulted from Time of ED Arrival to Time of ED Departure   UA MACROSCOPIC WITH REFLEX TO MICRO AND CULTURE - Abnormal       Result Value    Color Urine Yellow      Appearance Urine Cloudy (*)     Glucose Urine Negative      Bilirubin Urine Negative      Ketones Urine Negative      Specific Gravity Urine 1.003      Blood Urine Large (*)     pH Urine 7.0      Protein Albumin Urine 30 (*)     Urobilinogen Urine Normal      Nitrite Urine Positive (*)     Leukocyte Esterase Urine Large (*)     Bacteria Urine Moderate (*)     WBC Clumps Urine Present (*)     RBC Urine 18 (*)     WBC Urine >182 (*)    COMPREHENSIVE METABOLIC PANEL - Abnormal    Sodium 141      Potassium 3.6      Chloride 104      Carbon Dioxide (CO2) 23      Anion Gap 14      Urea Nitrogen 4.8 (*)     Creatinine 0.42 (*)     Calcium 8.2 (*)     Glucose 124 (*)     Alkaline Phosphatase 230 (*)     AST 17       ALT 10      Protein Total 5.3 (*)     Albumin 2.6 (*)     Bilirubin Total 0.2      GFR Estimate >90     CBC WITH PLATELETS AND DIFFERENTIAL - Abnormal    WBC Count 24.4 (*)     RBC Count 3.26 (*)     Hemoglobin 9.0 (*)     Hematocrit 29.0 (*)     MCV 89      MCH 27.6      MCHC 31.0 (*)     RDW 16.2 (*)     Platelet Count 1,180 (*)    DIFFERENTIAL - Abnormal    % Neutrophils 73      % Lymphocytes 8      % Monocytes 18      % Eosinophils 0      % Basophils 1      Absolute Neutrophils 17.8 (*)     Absolute Lymphocytes 2.0      Absolute Monocytes 4.4 (*)     Absolute Eosinophils 0.0      Absolute Basophils 0.2      RBC Morphology Confirmed RBC Indices      Platelet Assessment   (*)     Value: Automated Count Confirmed. Giant platelets are present.    Reactive Lymphocytes Present (*)    LIPASE - Normal    Lipase 55     URINE CULTURE       IMAGING  Images reviewed by me.  Radiology report also reviewed.  No orders to display       Procedures    Medications   sulfamethoxazole-trimethoprim (BACTRIM DS) 800-160 MG per tablet 160 mg (has no administration in time range)         IMPRESSION       ICD-10-CM    1. Acute cystitis without hematuria  N30.00                Medication List      Started    sulfamethoxazole-trimethoprim 800-160 MG tablet  Commonly known as: Bactrim DS  1 tablet, Oral, 2 TIMES DAILY                        Gagandeep Branch MD  04/17/23 0763

## 2023-04-17 NOTE — ED TRIAGE NOTES
Pt presents with lower abd pain that started last night. Hx significant for major abdominal surgery done two weeks ago (hysterectomy, splenectomy and colon.)     Triage Assessment     Row Name 04/17/23 0546       Triage Assessment (Adult)    Airway WDL WDL       Respiratory WDL    Respiratory WDL WDL       Skin Circulation/Temperature WDL    Skin Circulation/Temperature WDL WDL       Cardiac WDL    Cardiac WDL WDL       Cognitive/Neuro/Behavioral WDL    Cognitive/Neuro/Behavioral WDL WDL

## 2023-04-17 NOTE — DISCHARGE INSTRUCTIONS
RETURN TO THE EMERGENCY ROOM FOR THE FOLLOWING:    Severely worsened pain, vomiting and dehydration, concerning changes in behavior, lightheadedness or fainting, or at anytime for any concern.    FOLLOW UP:    Reach out to your primary clinic or surgery clinic for follow-up later this week.    TREATMENT RECOMMENDATIONS:    Bactrim DS twice daily for 7 days.    NURSE ADVICE LINE:  [phone] or [phone]

## 2023-04-19 LAB — BACTERIA UR CULT: ABNORMAL

## 2023-06-20 ENCOUNTER — HOSPITAL ENCOUNTER (EMERGENCY)
Facility: CLINIC | Age: 58
Discharge: HOME | End: 2023-06-20
Attending: EMERGENCY MEDICINE | Admitting: EMERGENCY MEDICINE
Payer: COMMERCIAL

## 2023-06-20 VITALS
SYSTOLIC BLOOD PRESSURE: 127 MMHG | RESPIRATION RATE: 16 BRPM | DIASTOLIC BLOOD PRESSURE: 76 MMHG | TEMPERATURE: 99.2 F | HEART RATE: 79 BPM | WEIGHT: 101 LBS | BODY MASS INDEX: 16.83 KG/M2 | HEIGHT: 65 IN | OXYGEN SATURATION: 97 %

## 2023-06-20 DIAGNOSIS — N30.01 ACUTE CYSTITIS WITH HEMATURIA: ICD-10-CM

## 2023-06-20 DIAGNOSIS — C83.398 DIFFUSE LARGE B-CELL LYMPHOMA OF EXTRANODAL SITE: ICD-10-CM

## 2023-06-20 LAB
ALBUMIN SERPL BCG-MCNC: 3.5 G/DL (ref 3.5–5.2)
ALBUMIN UR-MCNC: NEGATIVE MG/DL
ALP SERPL-CCNC: 281 U/L (ref 35–104)
ALT SERPL W P-5'-P-CCNC: 37 U/L (ref 0–50)
ANION GAP SERPL CALCULATED.3IONS-SCNC: 11 MMOL/L (ref 7–15)
APPEARANCE UR: ABNORMAL
AST SERPL W P-5'-P-CCNC: 34 U/L (ref 0–45)
BASOPHILS # BLD MANUAL: 0.1 10E3/UL (ref 0–0.2)
BASOPHILS NFR BLD MANUAL: 2 %
BILIRUB SERPL-MCNC: 0.2 MG/DL
BILIRUB UR QL STRIP: NEGATIVE
BUN SERPL-MCNC: 9.1 MG/DL (ref 8–23)
CALCIUM SERPL-MCNC: 9.7 MG/DL (ref 8.6–10)
CHLORIDE SERPL-SCNC: 96 MMOL/L (ref 98–107)
COLOR UR AUTO: YELLOW
CREAT SERPL-MCNC: 0.88 MG/DL (ref 0.51–0.95)
DEPRECATED HCO3 PLAS-SCNC: 28 MMOL/L (ref 22–29)
EOSINOPHIL # BLD MANUAL: 0 10E3/UL (ref 0–0.7)
EOSINOPHIL NFR BLD MANUAL: 0 %
ERYTHROCYTE [DISTWIDTH] IN BLOOD BY AUTOMATED COUNT: 17.9 % (ref 10–15)
GFR SERPL CREATININE-BSD FRML MDRD: 75 ML/MIN/1.73M2
GLUCOSE SERPL-MCNC: 108 MG/DL (ref 70–99)
GLUCOSE UR STRIP-MCNC: NEGATIVE MG/DL
HCT VFR BLD AUTO: 27.5 % (ref 35–47)
HGB BLD-MCNC: 8.9 G/DL (ref 11.7–15.7)
HGB UR QL STRIP: NEGATIVE
HOLD SPECIMEN: NORMAL
KETONES UR STRIP-MCNC: NEGATIVE MG/DL
LACTATE SERPL-SCNC: 0.9 MMOL/L (ref 0.7–2)
LEUKOCYTE ESTERASE UR QL STRIP: ABNORMAL
LYMPHOCYTES # BLD MANUAL: 1.6 10E3/UL (ref 0.8–5.3)
LYMPHOCYTES NFR BLD MANUAL: 37 %
MCH RBC QN AUTO: 27.3 PG (ref 26.5–33)
MCHC RBC AUTO-ENTMCNC: 32.4 G/DL (ref 31.5–36.5)
MCV RBC AUTO: 84 FL (ref 78–100)
MONOCYTES # BLD MANUAL: 1.8 10E3/UL (ref 0–1.3)
MONOCYTES NFR BLD MANUAL: 42 %
NEUTROPHILS # BLD MANUAL: 0.8 10E3/UL (ref 1.6–8.3)
NEUTROPHILS NFR BLD MANUAL: 19 %
NITRATE UR QL: NEGATIVE
PH UR STRIP: 7 [PH] (ref 5–7)
PLAT MORPH BLD: ABNORMAL
PLATELET # BLD AUTO: 605 10E3/UL (ref 150–450)
POTASSIUM SERPL-SCNC: 3.6 MMOL/L (ref 3.4–5.3)
PROCALCITONIN SERPL IA-MCNC: 0.17 NG/ML
PROT SERPL-MCNC: 6.2 G/DL (ref 6.4–8.3)
RBC # BLD AUTO: 3.26 10E6/UL (ref 3.8–5.2)
RBC MORPH BLD: ABNORMAL
RBC URINE: 4 /HPF
SODIUM SERPL-SCNC: 135 MMOL/L (ref 136–145)
SP GR UR STRIP: 1 (ref 1–1.03)
SQUAMOUS EPITHELIAL: 1 /HPF
UROBILINOGEN UR STRIP-MCNC: NORMAL MG/DL
WBC # BLD AUTO: 4.2 10E3/UL (ref 4–11)
WBC CLUMPS #/AREA URNS HPF: PRESENT /HPF
WBC URINE: 85 /HPF

## 2023-06-20 PROCEDURE — 96365 THER/PROPH/DIAG IV INF INIT: CPT | Performed by: EMERGENCY MEDICINE

## 2023-06-20 PROCEDURE — 99284 EMERGENCY DEPT VISIT MOD MDM: CPT | Mod: 25 | Performed by: EMERGENCY MEDICINE

## 2023-06-20 PROCEDURE — 87040 BLOOD CULTURE FOR BACTERIA: CPT | Performed by: EMERGENCY MEDICINE

## 2023-06-20 PROCEDURE — 83605 ASSAY OF LACTIC ACID: CPT | Performed by: EMERGENCY MEDICINE

## 2023-06-20 PROCEDURE — 85027 COMPLETE CBC AUTOMATED: CPT | Performed by: EMERGENCY MEDICINE

## 2023-06-20 PROCEDURE — 84145 PROCALCITONIN (PCT): CPT | Performed by: EMERGENCY MEDICINE

## 2023-06-20 PROCEDURE — 36415 COLL VENOUS BLD VENIPUNCTURE: CPT | Performed by: EMERGENCY MEDICINE

## 2023-06-20 PROCEDURE — 81001 URINALYSIS AUTO W/SCOPE: CPT | Performed by: EMERGENCY MEDICINE

## 2023-06-20 PROCEDURE — 85007 BL SMEAR W/DIFF WBC COUNT: CPT | Performed by: EMERGENCY MEDICINE

## 2023-06-20 PROCEDURE — 80053 COMPREHEN METABOLIC PANEL: CPT | Performed by: EMERGENCY MEDICINE

## 2023-06-20 PROCEDURE — 250N000011 HC RX IP 250 OP 636: Performed by: EMERGENCY MEDICINE

## 2023-06-20 PROCEDURE — 87086 URINE CULTURE/COLONY COUNT: CPT | Performed by: EMERGENCY MEDICINE

## 2023-06-20 PROCEDURE — 99284 EMERGENCY DEPT VISIT MOD MDM: CPT | Performed by: EMERGENCY MEDICINE

## 2023-06-20 RX ORDER — SULFAMETHOXAZOLE/TRIMETHOPRIM 800-160 MG
1 TABLET ORAL 2 TIMES DAILY
Qty: 14 TABLET | Refills: 0 | Status: SHIPPED | OUTPATIENT
Start: 2023-06-20 | End: 2023-06-27

## 2023-06-20 RX ORDER — CEFTRIAXONE 1 G/1
1 INJECTION, POWDER, FOR SOLUTION INTRAMUSCULAR; INTRAVENOUS ONCE
Status: COMPLETED | OUTPATIENT
Start: 2023-06-20 | End: 2023-06-20

## 2023-06-20 RX ADMIN — CEFTRIAXONE SODIUM 1 G: 1 INJECTION, POWDER, FOR SOLUTION INTRAMUSCULAR; INTRAVENOUS at 02:03

## 2023-06-20 ASSESSMENT — ACTIVITIES OF DAILY LIVING (ADL): ADLS_ACUITY_SCORE: 35

## 2023-06-20 NOTE — DISCHARGE INSTRUCTIONS
Follow up with your [facility] providers as planned.  Antibiotics as prescribed.     Be seen if worsening symptoms.

## 2023-06-20 NOTE — ED TRIAGE NOTES
Patient reports months of intermittent fevers. Patient reports temp will go from . Does not report any illnesses. Does receive chemo for cancer. Patient reports intermittent fevers started prior to cancer treatment.     Triage Assessment     Row Name 06/20/23 0101       Triage Assessment (Adult)    Airway WDL WDL       Respiratory WDL    Respiratory WDL WDL       Skin Circulation/Temperature WDL    Skin Circulation/Temperature WDL WDL       Cardiac WDL    Cardiac WDL WDL       Peripheral/Neurovascular WDL    Peripheral Neurovascular WDL WDL       Cognitive/Neuro/Behavioral WDL    Cognitive/Neuro/Behavioral WDL WDL

## 2023-06-20 NOTE — ED PROVIDER NOTES
History     Chief Complaint   Patient presents with     Fever     HPI  Devika Laird is a 57 year old female who has medical history significant for lymphoma, currently undergoing R-CHOP treatment.  Patient recently had her first round of R-CHOP on June 7, now 2 weeks ago.  She has history of endometrial cancer as well status post hysterectomy in April of this year.  Patient now presents to the emergency department with her  for concerns regarding fever.  She has had intermittent elevated temperature recordings over the last 2 weeks, which actually predate her recent chemotherapy treatment, and have been ongoing for actually many months.  However, over the past 2 weeks, patient with intermittent elevated temperatures, however they go up and down very quickly.  She was instructed if her temperature is greater than 100.4 degrees for greater than 1 hour to present to the emergency department.  Patient has had occasional body temperatures of 100.7, and up and down over the recent past.  She denies any severe headache.  No sore throat.  No earache.  Denies any cough, chest pain, shortness of breath.  No abdominal pain, or urinary or bowel symptoms.  No rash.  No other new medications other than Magic mouthwash.    Allergies:  Allergies   Allergen Reactions     Penicillin [Penicillins] Itching     Deep itching.        Problem List:    Patient Active Problem List    Diagnosis Date Noted     Health Care Home 06/28/2011     Priority: High     EMERGENCY CARE PLAN  Presenting Problem Signs and Symptoms Treatment Plan    Questions or conerns during clinic hours    I will call the clinic directly     Questions or conerns outside clinic hours    I will call the 24 hour nurse line at [phone]    Patient needs to schedule an appointment    I will call the 24 hour scheduling team at [phone] or clinic directly    Same day treatment     I will call the clinic first, nurse line if after hours, urgent care and express  care if needed                            DX V65.8 REPLACED WITH [id number] HEALTH CARE HOME (04/08/2013)       Family history of breast cancer in mother 05/04/2014     Priority: Medium     Screening for colorectal cancer 05/04/2014     Priority: Medium     Multiple sclerosis (H) 05/04/2014     Priority: Medium     Pulmonary nodule 10/15/2013     Priority: Medium     NICOLE nodule and associated lymphadenopathy.   First noted 08/2013  Axillary node bx - negative  EBUS bx - negative       History of tobacco use 10/15/2013     Priority: Medium     CARDIOVASCULAR SCREENING; LDL GOAL LESS THAN 160 10/31/2010     Priority: Medium     Uterine leiomyoma 12/07/2009     Priority: Medium     (Problem list name updated by automated process. Provider to review and confirm.)       MS (multiple sclerosis) (H) 12/04/2009     Priority: Medium     Mostly as vertigo.  On copaxon.  Well controlled over summer. Never hospitalized.        Other nonspecific abnormal result of function study of brain and central nervous system 11/09/2006     Priority: Medium     Anxiety state 05/03/2006     Priority: Medium     No meds at this time Lora Rivera MD  Controlled.      Problem list name updated by automated process. Provider to review          Past Medical History:    Past Medical History:   Diagnosis Date     Basal cell carcinoma      Mediastinal adenopathy      Multiple scleroses      Sprain of neck        Past Surgical History:    Past Surgical History:   Procedure Laterality Date     D & C       ENDOBRONCHIAL ULTRASOUND FLEXIBLE  9/16/2013    Procedure: ENDOBRONCHIAL ULTRASOUND FLEXIBLE;  WITH FNA;  Surgeon: Nathan Bucio MD;  Location:  GI     SURGICAL HISTORY OF -       cyst on jawline       Family History:    Family History   Problem Relation Age of Onset     Breast Cancer Mother         53 year when diagnosed     Gynecology Mother      Thyroid Disease Mother      Heart Disease Mother         valve issue/takes no med     Lipids  "Mother      Diabetes Maternal Grandmother      Heart Disease Maternal Grandmother      Cerebrovascular Disease Maternal Grandmother      Arthritis Maternal Grandmother      Hypertension Father      Musculoskeletal Disorder Father      Gastrointestinal Disease Paternal Grandmother      Arthritis Paternal Grandmother      Gastrointestinal Disease Paternal Grandfather      Cancer Paternal Grandfather         skin cancer     Hypertension Maternal Grandfather      Allergies Brother      Breast Cancer Maternal Aunt        Social History:  Marital Status:   [2]  Social History     Tobacco Use     Smoking status: Former     Packs/day: 0.10     Years: 20.00     Pack years: 2.00     Types: Cigarettes     Quit date: 8/10/2013     Years since quittin.8     Smokeless tobacco: Never     Tobacco comments:     2-3 cigs per day off and on 3/2013   Substance Use Topics     Alcohol use: No     Alcohol/week: 1.7 standard drinks of alcohol     Types: 2 Standard drinks or equivalent per week     Drug use: No        Medications:    sulfamethoxazole-trimethoprim (BACTRIM DS) 800-160 MG tablet  aspirin 81 MG tablet  Cholecalciferol (VITAMIN D) 2000 UNIT tablet  COPAXONE 20 MG/ML injection  cyanocolbalamin (VITAMIN  B-12) 1000 MCG tablet  IBUPROFEN PO  magnesium 100 MG CAPS  morphine (MSIR) 15 MG IR tablet  ondansetron (ZOFRAN ODT) 4 MG ODT tab  Zinc 100 MG TABS          Review of Systems  See HPI  Physical Exam   BP: (!) 146/86  Pulse: 87  Temp: 99.2  F (37.3  C)  Resp: 16  Height: 165.1 cm (5' 5\")  Weight: 45.8 kg (101 lb)  SpO2: 99 %      Physical Exam  /76   Pulse 79   Temp 99.2  F (37.3  C) (Oral)   Resp 16   Ht 1.651 m (5' 5\")   Wt 45.8 kg (101 lb)   SpO2 97%   BMI 16.81 kg/m    General: alert and in no acute distress  Head: atraumatic, normocephalic  Abd: nondistended and nontender  Musculoskel/Extremities: normal extremities, no apparent edema, and full AROM of major joints  Skin: no rashes, no diaphoresis and " skin color normal  Neuro: Patient awake, alert, oriented, speech is fluent, gait is normal  Psychiatric: affect/mood normal, cooperative, normal judgement/insight and memory intact      ED Course                 Procedures              Critical Care time:  none               Results for orders placed or performed during the hospital encounter of 06/20/23 (from the past 24 hour(s))   [place] Draw    Narrative    The following orders were created for panel order [place] Draw.  Procedure                               Abnormality         Status                     ---------                               -----------         ------                     Extra Blue Top Tube[[id number]]                              Final result               Extra Red Top Tube[[id number]]                               Final result               Extra Green Top (Lithium...[[id number]]                                                 Extra Purple Top Tube[[id number]]                            Final result               Extra Urine Collection[[id number]]                                                        Please view results for these tests on the individual orders.   CBC with platelets differential    Narrative    The following orders were created for panel order CBC with platelets differential.  Procedure                               Abnormality         Status                     ---------                               -----------         ------                     CBC with platelets and d...[[id number]]  Abnormal            Final result               Manual Differential[[id number]]          Abnormal            Final result                 Please view results for these tests on the individual orders.   Comprehensive metabolic panel   Result Value Ref Range    Sodium 135 (L) 136 - 145 mmol/L    Potassium 3.6 3.4 - 5.3 mmol/L    Chloride 96 (L) 98 - 107 mmol/L    Carbon Dioxide (CO2) 28 22 - 29 mmol/L    Anion Gap 11 7 - 15 mmol/L    Urea Nitrogen  9.1 8.0 - 23.0 mg/dL    Creatinine 0.88 0.51 - 0.95 mg/dL    Calcium 9.7 8.6 - 10.0 mg/dL    Glucose 108 (H) 70 - 99 mg/dL    Alkaline Phosphatase 281 (H) 35 - 104 U/L    AST 34 0 - 45 U/L    ALT 37 0 - 50 U/L    Protein Total 6.2 (L) 6.4 - 8.3 g/dL    Albumin 3.5 3.5 - 5.2 g/dL    Bilirubin Total 0.2 <=1.2 mg/dL    GFR Estimate 75 >60 mL/min/1.73m2   Lactic acid whole blood   Result Value Ref Range    Lactic Acid 0.9 0.7 - 2.0 mmol/L   Procalcitonin   Result Value Ref Range    Procalcitonin 0.17 (H) <0.05 ng/mL   Extra Blue Top Tube   Result Value Ref Range    Hold Specimen JIC    Extra Red Top Tube   Result Value Ref Range    Hold Specimen JIC    Extra Purple Top Tube   Result Value Ref Range    Hold Specimen JIC    CBC with platelets and differential   Result Value Ref Range    WBC Count 4.2 4.0 - 11.0 10e3/uL    RBC Count 3.26 (L) 3.80 - 5.20 10e6/uL    Hemoglobin 8.9 (L) 11.7 - 15.7 g/dL    Hematocrit 27.5 (L) 35.0 - 47.0 %    MCV 84 78 - 100 fL    MCH 27.3 26.5 - 33.0 pg    MCHC 32.4 31.5 - 36.5 g/dL    RDW 17.9 (H) 10.0 - 15.0 %    Platelet Count 605 (H) 150 - 450 10e3/uL   Manual Differential   Result Value Ref Range    % Neutrophils 19 %    % Lymphocytes 37 %    % Monocytes 42 %    % Eosinophils 0 %    % Basophils 2 %    Absolute Neutrophils 0.8 (L) 1.6 - 8.3 10e3/uL    Absolute Lymphocytes 1.6 0.8 - 5.3 10e3/uL    Absolute Monocytes 1.8 (H) 0.0 - 1.3 10e3/uL    Absolute Eosinophils 0.0 0.0 - 0.7 10e3/uL    Absolute Basophils 0.1 0.0 - 0.2 10e3/uL    RBC Morphology Confirmed RBC Indices     Platelet Assessment (A) Automated Count Confirmed. Platelet morphology is normal.     Automated Count Confirmed. Giant platelets are present.   UA with Microscopic   Result Value Ref Range    Color Urine Yellow Colorless, Straw, Light Yellow, Yellow    Appearance Urine Slightly Cloudy (A) Clear    Glucose Urine Negative Negative mg/dL    Bilirubin Urine Negative Negative    Ketones Urine Negative Negative mg/dL     Specific Gravity Urine 1.004 1.003 - 1.035    Blood Urine Negative Negative    pH Urine 7.0 5.0 - 7.0    Protein Albumin Urine Negative Negative mg/dL    Urobilinogen Urine Normal Normal, 2.0 mg/dL    Nitrite Urine Negative Negative    Leukocyte Esterase Urine Large (A) Negative    WBC Clumps Urine Present (A) None Seen /HPF    RBC Urine 4 (H) <=2 /HPF    WBC Urine 85 (H) <=5 /HPF    Squamous Epithelials Urine 1 <=1 /HPF       Medications   sodium chloride (PF) 0.9% PF flush 3 mL (has no administration in time range)   sodium chloride (PF) 0.9% PF flush 3 mL (has no administration in time range)   cefTRIAXone (ROCEPHIN) 1 g vial to attach to  mL bag for ADULTS or NS 50 mL bag for PEDS (0 g Intravenous Stopped 6/20/23 0233)       Assessments & Plan (with Medical Decision Making)  57 year old female with medical history significant for lymphoma recently started on R-CHOP treatment, now presenting to the emergency department with  for concerns regarding intermittent fevers, present over the past couple of weeks, however actually present over the past many months.  Patient arrives to the emergency department afebrile at 99.2 degrees.  No other obvious infectious source.  IV established, labs drawn, cultures obtained.    Patient has had issues with hypercalcemia before in the past.  I reviewed prior hospitalization, and she had admission on June 14 for hypercalcemia, and was treated with Zometa.  Patient now presents with intermittent fevers over the past 2 weeks.  No other really specific symptoms to identify cause of fevers, and therefore broad septic work-up is pursued including cultures.    Ultimately, urinalysis did return with signs of bladder infection.  Patient given dose of IV Rocephin and will be continued on Bactrim as an outpatient.  Has tolerated Bactrim well previously.  Patient does have follow-up with her oncology team in 2 days.  Instructed on maintaining that appointment.  She is instructed  to return or be seen if new or worsening symptoms develop.  I did consider hospitalization, however given the source of likely bladder infection contributing to fevers, with white blood cell count that is normal, and only minimally elevated procalcitonin, I feel outpatient management is appropriate, and patient does have close follow-up in 2 days.     I have reviewed the nursing notes.    I have reviewed the findings, diagnosis, plan and need for follow up with the patient.               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

## 2023-06-21 LAB — BACTERIA UR CULT: ABNORMAL

## 2023-06-21 NOTE — RESULT ENCOUNTER NOTE
[facility] Emergency Dept discharge antibiotic (if prescribed): Sulfamethoxazole-Trimethoprim (Bactrim DS, Septra DS) 800-160 mg PO tablet,  1 tablet by mouth 2 times daily for 7 days.   Date of Rx (if applicable):  6/20/23  No changes in treatment per [facility] ED Lab Result Urine culture protocol.

## 2023-06-22 NOTE — RESULT ENCOUNTER NOTE
Final Urine Culture Report on 6/21/23  [facility] Emergency Dept discharge antibiotic prescribed: Sulfamethoxazole-Trimethoprim (Bactrim DS, Septra DS) 800-160 mg PO tablet,  1 tablet by mouth 2 times daily for 7 days  #1. Bacteria, 10,000 - 50,000 CFU/mL Citrobacter freundii complex is SUSCEPTIBLE to Antibiotic.    No change in treatment per [facility] ED lab result Urine Culture protocol.

## 2023-06-25 LAB
BACTERIA BLD CULT: NO GROWTH
BACTERIA BLD CULT: NO GROWTH
